# Patient Record
Sex: FEMALE | Race: WHITE | NOT HISPANIC OR LATINO | Employment: FULL TIME | URBAN - METROPOLITAN AREA
[De-identification: names, ages, dates, MRNs, and addresses within clinical notes are randomized per-mention and may not be internally consistent; named-entity substitution may affect disease eponyms.]

---

## 2019-02-18 ENCOUNTER — INITIAL PRENATAL (OUTPATIENT)
Dept: OBGYN CLINIC | Facility: CLINIC | Age: 34
End: 2019-02-18

## 2019-02-18 VITALS
BODY MASS INDEX: 27.28 KG/M2 | RESPIRATION RATE: 16 BRPM | WEIGHT: 180 LBS | SYSTOLIC BLOOD PRESSURE: 124 MMHG | DIASTOLIC BLOOD PRESSURE: 86 MMHG | HEART RATE: 88 BPM | HEIGHT: 68 IN

## 2019-02-18 DIAGNOSIS — Z34.01 ENCOUNTER FOR SUPERVISION OF NORMAL FIRST PREGNANCY IN FIRST TRIMESTER: Primary | ICD-10-CM

## 2019-02-18 DIAGNOSIS — Z3A.09 9 WEEKS GESTATION OF PREGNANCY: ICD-10-CM

## 2019-02-18 PROCEDURE — NOBC: Performed by: OBSTETRICS & GYNECOLOGY

## 2019-02-18 RX ORDER — PRENATAL VIT 91/IRON/FOLIC/DHA 28-975-200
1 COMBINATION PACKAGE (EA) ORAL DAILY
COMMUNITY

## 2019-02-18 NOTE — PROGRESS NOTES
Patient presents for OB intake interview  Accompanied by   Planned pregnancy, FOB involved and supportive     Patient's last menstrual period was 2018 (exact date)  Estimated Date of Delivery: None noted  Prenatal labs ordered including Obstetrical panel with reflex HIV  Pregnancy symptoms - breast tenderness, fatigue, frequent urination, nausea and positive home pregnancy test  Cramping: yes - slight in the first few weeks  Bleeding: no   Tdap - counseled to be given after 27 weeks  Influenza vaccine discussed and information sheet given  Vaccinated: yes  PICA cravings: no  Hx of MRSA: no  Dental visit with last 6 months - no  If no, recommendations discussed  PHQ 9 screening results - 0  Last Pap - unknown  Interview education:  Stan Hernandez Pregnancy Essentials booklet given to patient  Reviewed and explained  Handouts given at today's visit     [facility] phone application guide   [address] support center   CDCs Response to [address] [place] [address] Maternal Fetal Medicine        Sequential screening pamphlet                   Cystic fibrosis information  MyChart activated: no - information sheet given    Interview done by : Etienne Barbour RN       19

## 2019-02-20 LAB
ABO GROUP BLD: (no result)
BASOPHILS # BLD AUTO: 0 X10E3/UL (ref 0–0.2)
BASOPHILS NFR BLD AUTO: 0 %
BLD GP AB SCN SERPL QL: NEGATIVE
EOSINOPHIL # BLD AUTO: 0.1 X10E3/UL (ref 0–0.4)
EOSINOPHIL NFR BLD AUTO: 1 %
ERYTHROCYTE [DISTWIDTH] IN BLOOD BY AUTOMATED COUNT: 13.8 % (ref 12.3–15.4)
HBV SURFACE AG SERPL QL IA: NEGATIVE
HCT VFR BLD AUTO: 41.9 % (ref 34–46.6)
HGB BLD-MCNC: 14 G/DL (ref 11.1–15.9)
HIV 1+2 AB+HIV1 P24 AG SERPL QL IA: NON REACTIVE
IMM GRANULOCYTES # BLD: 0 X10E3/UL (ref 0–0.1)
IMM GRANULOCYTES NFR BLD: 0 %
LYMPHOCYTES # BLD AUTO: 1.6 X10E3/UL (ref 0.7–3.1)
LYMPHOCYTES NFR BLD AUTO: 16 %
MCH RBC QN AUTO: 29.2 PG (ref 26.6–33)
MCHC RBC AUTO-ENTMCNC: 33.4 G/DL (ref 31.5–35.7)
MCV RBC AUTO: 88 FL (ref 79–97)
MONOCYTES # BLD AUTO: 0.4 X10E3/UL (ref 0.1–0.9)
MONOCYTES NFR BLD AUTO: 4 %
NEUTROPHILS # BLD AUTO: 7.8 X10E3/UL (ref 1.4–7)
NEUTROPHILS NFR BLD AUTO: 79 %
PLATELET # BLD AUTO: 309 X10E3/UL (ref 150–379)
RBC # BLD AUTO: 4.79 X10E6/UL (ref 3.77–5.28)
RH BLD: POSITIVE
RPR SER QL: NON REACTIVE
RUBV IGG SERPL IA-ACNC: 5.93 INDEX
WBC # BLD AUTO: 9.9 X10E3/UL (ref 3.4–10.8)

## 2019-02-26 ENCOUNTER — HOSPITAL ENCOUNTER (OUTPATIENT)
Dept: RADIOLOGY | Facility: HOSPITAL | Age: 34
Discharge: HOME | End: 2019-02-26
Payer: COMMERCIAL

## 2019-02-26 DIAGNOSIS — Z34.01 ENCOUNTER FOR SUPERVISION OF NORMAL FIRST PREGNANCY IN FIRST TRIMESTER: ICD-10-CM

## 2019-02-26 DIAGNOSIS — Z3A.09 9 WEEKS GESTATION OF PREGNANCY: ICD-10-CM

## 2019-02-26 PROCEDURE — 76801 OB US < 14 WKS SINGLE FETUS: CPT

## 2019-03-07 ENCOUNTER — ROUTINE PRENATAL (OUTPATIENT)
Dept: OBGYN CLINIC | Facility: CLINIC | Age: 34
End: 2019-03-07
Payer: COMMERCIAL

## 2019-03-07 VITALS — SYSTOLIC BLOOD PRESSURE: 132 MMHG | BODY MASS INDEX: 27.06 KG/M2 | WEIGHT: 178 LBS | DIASTOLIC BLOOD PRESSURE: 80 MMHG

## 2019-03-07 DIAGNOSIS — Z12.4 SCREENING FOR MALIGNANT NEOPLASM OF CERVIX: ICD-10-CM

## 2019-03-07 DIAGNOSIS — Z34.01 ENCOUNTER FOR SUPERVISION OF NORMAL FIRST PREGNANCY IN FIRST TRIMESTER: Primary | ICD-10-CM

## 2019-03-07 DIAGNOSIS — Z11.51 SCREENING FOR HPV (HUMAN PAPILLOMAVIRUS): ICD-10-CM

## 2019-03-07 DIAGNOSIS — Z3A.12 12 WEEKS GESTATION OF PREGNANCY: ICD-10-CM

## 2019-03-07 DIAGNOSIS — Z11.3 SCREENING FOR STD (SEXUALLY TRANSMITTED DISEASE): ICD-10-CM

## 2019-03-07 PROCEDURE — NOBC: Performed by: OBSTETRICS & GYNECOLOGY

## 2019-03-07 PROCEDURE — 76817 TRANSVAGINAL US OBSTETRIC: CPT | Performed by: OBSTETRICS & GYNECOLOGY

## 2019-03-07 NOTE — PROGRESS NOTES
Pt is tired and nauseous, but no other complaints  Pt is due for her pap and GC testing today  Pt had her flu shot

## 2019-03-07 NOTE — PROGRESS NOTES
This is a 36 y o   at 12w0d by 10 wk US/LMP who presents for initial OB visit  No complaints  She denies nausea, vomiting, cramping, vaginal bleeding  This is a planned and desired pregnancy  BP: 132/80  Ultrasound: Adame IUP with cardiac activity  Normal appearing uterus, left ovary  R ovary with small, simple appearing cyst    Placenta appears to be posterior with leading edge just overlying the cervix  Reassess at 20 wk US  Pap/GCC performed  Prenatal labs reviewed and wnl  Practice policies reviewed  Weight gain, diet, exercise recommendations discussed  Genetic screening declined  Level 2 US ordered  S/p flu shot at intake visit  History of anxiety - not on meds for several years  Does state she anticipates significant anxiety at time of delivery - she is afraid of medical procedures, has never stayed overnight in a hospital  Discussed prenatal classes, hospital tour, increased risk of postpartum anxiety  Ultrasound Probe Disinfection    A transvaginal ultrasound was performed     Prior to use, disinfection was performed with High Level Disinfection Process (Trophon)  Probe serial number [id number]: [id number] was used    Sara Shields DO  19  1:34 PM

## 2019-03-12 LAB
C TRACH RRNA CVX QL NAA+PROBE: NEGATIVE
CYTOLOGIST CVX/VAG CYTO: NORMAL
DX ICD CODE: NORMAL
HPV I/H RISK 1 DNA CVX QL PROBE+SIG AMP: NEGATIVE
N GONORRHOEA RRNA CVX QL NAA+PROBE: NEGATIVE
OTHER STN SPEC: NORMAL
PATH REPORT.FINAL DX SPEC: NORMAL
SL AMB NOTE:: NORMAL
SL AMB SPECIMEN ADEQUACY: NORMAL
SL AMB TEST METHODOLOGY: NORMAL

## 2019-04-02 ENCOUNTER — ROUTINE PRENATAL (OUTPATIENT)
Dept: OBGYN CLINIC | Facility: CLINIC | Age: 34
End: 2019-04-02

## 2019-04-02 VITALS — SYSTOLIC BLOOD PRESSURE: 112 MMHG | WEIGHT: 183 LBS | BODY MASS INDEX: 27.83 KG/M2 | DIASTOLIC BLOOD PRESSURE: 64 MMHG

## 2019-04-02 DIAGNOSIS — Z3A.15 15 WEEKS GESTATION OF PREGNANCY: ICD-10-CM

## 2019-04-02 DIAGNOSIS — Z34.82 ENCOUNTER FOR SUPERVISION OF OTHER NORMAL PREGNANCY, SECOND TRIMESTER: Primary | ICD-10-CM

## 2019-04-02 PROCEDURE — PNV: Performed by: OBSTETRICS & GYNECOLOGY

## 2019-04-30 ENCOUNTER — ROUTINE PRENATAL (OUTPATIENT)
Dept: OBGYN CLINIC | Facility: CLINIC | Age: 34
End: 2019-04-30

## 2019-04-30 VITALS — SYSTOLIC BLOOD PRESSURE: 130 MMHG | DIASTOLIC BLOOD PRESSURE: 82 MMHG | BODY MASS INDEX: 27.83 KG/M2 | WEIGHT: 183 LBS

## 2019-04-30 DIAGNOSIS — Z34.82 ENCOUNTER FOR SUPERVISION OF OTHER NORMAL PREGNANCY, SECOND TRIMESTER: Primary | ICD-10-CM

## 2019-04-30 DIAGNOSIS — Z3A.19 19 WEEKS GESTATION OF PREGNANCY: ICD-10-CM

## 2019-04-30 DIAGNOSIS — R12 HEART BURN: ICD-10-CM

## 2019-04-30 PROCEDURE — PNV: Performed by: OBSTETRICS & GYNECOLOGY

## 2019-04-30 RX ORDER — CALCIUM CARBONATE 200(500)MG
1 TABLET,CHEWABLE ORAL DAILY
COMMUNITY

## 2019-04-30 RX ORDER — FAMOTIDINE 20 MG/1
20 TABLET, FILM COATED ORAL 2 TIMES DAILY
COMMUNITY
End: 2019-07-15

## 2019-04-30 RX ORDER — OMEPRAZOLE 20 MG/1
20 CAPSULE, DELAYED RELEASE ORAL DAILY
Qty: 90 CAPSULE | Refills: 3 | Status: SHIPPED | OUTPATIENT
Start: 2019-04-30 | End: 2019-05-16 | Stop reason: ALTCHOICE

## 2019-05-15 PROBLEM — Z3A.19 19 WEEKS GESTATION OF PREGNANCY: Status: RESOLVED | Noted: 2019-03-07 | Resolved: 2019-05-15

## 2019-05-16 ENCOUNTER — ROUTINE PRENATAL (OUTPATIENT)
Dept: PERINATAL CARE | Facility: CLINIC | Age: 34
End: 2019-05-16
Payer: COMMERCIAL

## 2019-05-16 VITALS
HEIGHT: 69 IN | HEART RATE: 84 BPM | SYSTOLIC BLOOD PRESSURE: 128 MMHG | WEIGHT: 186.2 LBS | DIASTOLIC BLOOD PRESSURE: 81 MMHG | BODY MASS INDEX: 27.58 KG/M2

## 2019-05-16 DIAGNOSIS — Z36.3 ENCOUNTER FOR ANTENATAL SCREENING FOR MALFORMATION: ICD-10-CM

## 2019-05-16 DIAGNOSIS — Z3A.22 22 WEEKS GESTATION OF PREGNANCY: ICD-10-CM

## 2019-05-16 DIAGNOSIS — Z36.86 ENCOUNTER FOR ANTENATAL SCREENING FOR CERVICAL LENGTH: Primary | ICD-10-CM

## 2019-05-16 DIAGNOSIS — O44.00 PLACENTA PREVIA ANTEPARTUM: ICD-10-CM

## 2019-05-16 PROCEDURE — 76805 OB US >/= 14 WKS SNGL FETUS: CPT | Performed by: OBSTETRICS & GYNECOLOGY

## 2019-05-16 PROCEDURE — 99242 OFF/OP CONSLTJ NEW/EST SF 20: CPT | Performed by: OBSTETRICS & GYNECOLOGY

## 2019-05-16 PROCEDURE — 76817 TRANSVAGINAL US OBSTETRIC: CPT | Performed by: OBSTETRICS & GYNECOLOGY

## 2019-05-16 RX ORDER — FLUTICASONE PROPIONATE 50 MCG
1 SPRAY, SUSPENSION (ML) NASAL DAILY
COMMUNITY
End: 2019-07-15

## 2019-05-28 ENCOUNTER — ROUTINE PRENATAL (OUTPATIENT)
Dept: OBGYN CLINIC | Facility: CLINIC | Age: 34
End: 2019-05-28

## 2019-05-28 VITALS — DIASTOLIC BLOOD PRESSURE: 74 MMHG | WEIGHT: 187 LBS | SYSTOLIC BLOOD PRESSURE: 122 MMHG | BODY MASS INDEX: 27.62 KG/M2

## 2019-05-28 DIAGNOSIS — Z34.82 ENCOUNTER FOR SUPERVISION OF OTHER NORMAL PREGNANCY IN SECOND TRIMESTER: Primary | ICD-10-CM

## 2019-05-28 DIAGNOSIS — Z34.82 ENCOUNTER FOR SUPERVISION OF OTHER NORMAL PREGNANCY, SECOND TRIMESTER: ICD-10-CM

## 2019-05-28 DIAGNOSIS — Z3A.23 23 WEEKS GESTATION OF PREGNANCY: ICD-10-CM

## 2019-05-28 PROCEDURE — PNV: Performed by: OBSTETRICS & GYNECOLOGY

## 2019-06-13 LAB
BASOPHILS # BLD AUTO: 0 X10E3/UL (ref 0–0.2)
BASOPHILS NFR BLD AUTO: 0 %
EOSINOPHIL # BLD AUTO: 0.1 X10E3/UL (ref 0–0.4)
EOSINOPHIL NFR BLD AUTO: 1 %
ERYTHROCYTE [DISTWIDTH] IN BLOOD BY AUTOMATED COUNT: 13.9 % (ref 12.3–15.4)
GLUCOSE 1H P 50 G GLC PO SERPL-MCNC: 114 MG/DL (ref 65–139)
HCT VFR BLD AUTO: 30.2 % (ref 34–46.6)
HGB BLD-MCNC: 10 G/DL (ref 11.1–15.9)
IMM GRANULOCYTES # BLD: 0.1 X10E3/UL (ref 0–0.1)
IMM GRANULOCYTES NFR BLD: 1 %
LYMPHOCYTES # BLD AUTO: 1.4 X10E3/UL (ref 0.7–3.1)
LYMPHOCYTES NFR BLD AUTO: 14 %
MCH RBC QN AUTO: 29.9 PG (ref 26.6–33)
MCHC RBC AUTO-ENTMCNC: 33.1 G/DL (ref 31.5–35.7)
MCV RBC AUTO: 90 FL (ref 79–97)
MONOCYTES # BLD AUTO: 0.6 X10E3/UL (ref 0.1–0.9)
MONOCYTES NFR BLD AUTO: 6 %
NEUTROPHILS # BLD AUTO: 7.9 X10E3/UL (ref 1.4–7)
NEUTROPHILS NFR BLD AUTO: 78 %
PLATELET # BLD AUTO: 238 X10E3/UL (ref 150–450)
RBC # BLD AUTO: 3.35 X10E6/UL (ref 3.77–5.28)
RPR SER QL: NON REACTIVE
WBC # BLD AUTO: 10.1 X10E3/UL (ref 3.4–10.8)

## 2019-06-28 ENCOUNTER — ROUTINE PRENATAL (OUTPATIENT)
Dept: OBGYN CLINIC | Facility: CLINIC | Age: 34
End: 2019-06-28
Payer: COMMERCIAL

## 2019-06-28 VITALS — WEIGHT: 194 LBS | SYSTOLIC BLOOD PRESSURE: 112 MMHG | DIASTOLIC BLOOD PRESSURE: 64 MMHG | BODY MASS INDEX: 28.65 KG/M2

## 2019-06-28 DIAGNOSIS — Z3A.28 28 WEEKS GESTATION OF PREGNANCY: ICD-10-CM

## 2019-06-28 DIAGNOSIS — Z23 NEED FOR TDAP VACCINATION: ICD-10-CM

## 2019-06-28 DIAGNOSIS — R12 HEART BURN: ICD-10-CM

## 2019-06-28 DIAGNOSIS — Z34.83 ENCOUNTER FOR SUPERVISION OF OTHER NORMAL PREGNANCY, THIRD TRIMESTER: Primary | ICD-10-CM

## 2019-06-28 PROCEDURE — 90715 TDAP VACCINE 7 YRS/> IM: CPT | Performed by: OBSTETRICS & GYNECOLOGY

## 2019-06-28 PROCEDURE — 90471 IMMUNIZATION ADMIN: CPT | Performed by: OBSTETRICS & GYNECOLOGY

## 2019-06-28 PROCEDURE — PNV: Performed by: OBSTETRICS & GYNECOLOGY

## 2019-06-28 RX ORDER — OMEPRAZOLE 10 MG/1
20 CAPSULE, DELAYED RELEASE ORAL DAILY
Qty: 60 CAPSULE | Refills: 0 | Status: SHIPPED | OUTPATIENT
Start: 2019-06-28 | End: 2019-07-29

## 2019-07-11 ENCOUNTER — ULTRASOUND (OUTPATIENT)
Dept: PERINATAL CARE | Facility: CLINIC | Age: 34
End: 2019-07-11
Payer: COMMERCIAL

## 2019-07-11 VITALS
BODY MASS INDEX: 28.35 KG/M2 | DIASTOLIC BLOOD PRESSURE: 95 MMHG | HEIGHT: 70 IN | WEIGHT: 198 LBS | SYSTOLIC BLOOD PRESSURE: 134 MMHG | HEART RATE: 98 BPM

## 2019-07-11 DIAGNOSIS — Z34.83 ENCOUNTER FOR SUPERVISION OF OTHER NORMAL PREGNANCY, THIRD TRIMESTER: ICD-10-CM

## 2019-07-11 DIAGNOSIS — O44.00 PLACENTA PREVIA ANTEPARTUM: Primary | ICD-10-CM

## 2019-07-11 DIAGNOSIS — Z3A.30 30 WEEKS GESTATION OF PREGNANCY: ICD-10-CM

## 2019-07-11 PROCEDURE — 76817 TRANSVAGINAL US OBSTETRIC: CPT | Performed by: OBSTETRICS & GYNECOLOGY

## 2019-07-11 PROCEDURE — 76816 OB US FOLLOW-UP PER FETUS: CPT | Performed by: OBSTETRICS & GYNECOLOGY

## 2019-07-11 RX ORDER — FERROUS SULFATE 325(65) MG
325 TABLET ORAL
COMMUNITY
End: 2019-09-19 | Stop reason: ALTCHOICE

## 2019-07-11 NOTE — PROGRESS NOTES
The patient was seen today for an ultrasound  Please see ultrasound report (located under Ob Procedures) for additional details  Thank you very much for allowing us to participate in the care of this very nice patient  Should you have any questions, please do not hesitate to contact me  Oscar Bains MD [phone]  Attending Physician, [place]

## 2019-07-15 ENCOUNTER — ROUTINE PRENATAL (OUTPATIENT)
Dept: OBGYN CLINIC | Facility: CLINIC | Age: 34
End: 2019-07-15

## 2019-07-15 VITALS — SYSTOLIC BLOOD PRESSURE: 128 MMHG | BODY MASS INDEX: 28.27 KG/M2 | WEIGHT: 197 LBS | DIASTOLIC BLOOD PRESSURE: 84 MMHG

## 2019-07-15 DIAGNOSIS — Z3A.30 30 WEEKS GESTATION OF PREGNANCY: ICD-10-CM

## 2019-07-15 DIAGNOSIS — Z34.03 ENCOUNTER FOR SUPERVISION OF NORMAL FIRST PREGNANCY IN THIRD TRIMESTER: Primary | ICD-10-CM

## 2019-07-15 PROCEDURE — PNV: Performed by: OBSTETRICS & GYNECOLOGY

## 2019-07-15 NOTE — PROGRESS NOTES
36 y o    female at 30w3d EGA for PNV  BP : 128/84  TWlbs  Fibroid on US - 2cm  Not likely to affect pregnancy, may resolve post partum  No change since scan at 13weeks  Discussed epidural for pain management, risks/benefits  Patient has a lot of anxiety regarding epidural   She plans on doing a birthing class  Patient plans on breast-feeding

## 2019-07-29 ENCOUNTER — TELEPHONE (OUTPATIENT)
Dept: OBGYN CLINIC | Facility: CLINIC | Age: 34
End: 2019-07-29

## 2019-07-29 DIAGNOSIS — R12 HEART BURN: ICD-10-CM

## 2019-07-29 RX ORDER — OMEPRAZOLE 10 MG/1
20 CAPSULE, DELAYED RELEASE ORAL DAILY
Qty: 60 CAPSULE | Refills: 0 | Status: SHIPPED | OUTPATIENT
Start: 2019-07-29 | End: 2019-08-29 | Stop reason: SDUPTHER

## 2019-08-01 ENCOUNTER — ROUTINE PRENATAL (OUTPATIENT)
Dept: OBGYN CLINIC | Facility: CLINIC | Age: 34
End: 2019-08-01

## 2019-08-01 VITALS — SYSTOLIC BLOOD PRESSURE: 124 MMHG | WEIGHT: 200 LBS | BODY MASS INDEX: 28.7 KG/M2 | DIASTOLIC BLOOD PRESSURE: 70 MMHG

## 2019-08-01 DIAGNOSIS — Z34.03 ENCOUNTER FOR SUPERVISION OF NORMAL FIRST PREGNANCY IN THIRD TRIMESTER: Primary | ICD-10-CM

## 2019-08-01 DIAGNOSIS — Z3A.33 33 WEEKS GESTATION OF PREGNANCY: ICD-10-CM

## 2019-08-01 DIAGNOSIS — Z34.83 ENCOUNTER FOR SUPERVISION OF OTHER NORMAL PREGNANCY, THIRD TRIMESTER: ICD-10-CM

## 2019-08-01 PROCEDURE — PNV: Performed by: OBSTETRICS & GYNECOLOGY

## 2019-08-01 NOTE — PROGRESS NOTES
C/o cramping on right side   C/o lower pelvic pain on left side when going from sitting to standing position   Perineal massage info due   Urine dip neg/neg

## 2019-08-01 NOTE — PROGRESS NOTES
This is a 36 y o   at 33w0d who presents for return OB visit  No complaints  Denies contractions, leakage, bleeding   Endorses fetal movement   BP: 124/70 TWlb  Discussed normal discomforts of pregnancy  Perineal massage info given  F/up 2 wks

## 2019-08-14 ENCOUNTER — ROUTINE PRENATAL (OUTPATIENT)
Dept: OBGYN CLINIC | Facility: CLINIC | Age: 34
End: 2019-08-14

## 2019-08-14 VITALS — SYSTOLIC BLOOD PRESSURE: 130 MMHG | DIASTOLIC BLOOD PRESSURE: 84 MMHG | BODY MASS INDEX: 29.21 KG/M2 | WEIGHT: 203.6 LBS

## 2019-08-14 DIAGNOSIS — O16.3 ELEVATED BLOOD PRESSURE AFFECTING PREGNANCY IN THIRD TRIMESTER, ANTEPARTUM: Primary | ICD-10-CM

## 2019-08-14 DIAGNOSIS — Z34.03 ENCOUNTER FOR SUPERVISION OF NORMAL FIRST PREGNANCY IN THIRD TRIMESTER: ICD-10-CM

## 2019-08-14 DIAGNOSIS — Z34.83 ENCOUNTER FOR SUPERVISION OF OTHER NORMAL PREGNANCY, THIRD TRIMESTER: ICD-10-CM

## 2019-08-14 DIAGNOSIS — Z3A.34 34 WEEKS GESTATION OF PREGNANCY: ICD-10-CM

## 2019-08-14 PROCEDURE — PNV: Performed by: OBSTETRICS & GYNECOLOGY

## 2019-08-14 NOTE — PROGRESS NOTES
This is a 36 y o   at 34w6d who presents for return OB visit  Feeling tingling in her hands that wakes her up in the early morning - likely carpal tunnel, discussed rigid wrist braces  Denies contractions, leakage, bleeding  Endorses fetal movement   BP: 144/90, repeat 130/84 TW lb  Elevated BP: Pt reports having elevated BP at other doc's offices prior to pregnancy but no formal dx of HTN, never been treated  Asymptomatic  Pre-E labs and P:C ordered  Discussed preeclampsia precautions  F/up 1 wk for BP check, will need NST if GHTN is diagnosed

## 2019-08-15 LAB
ALBUMIN SERPL-MCNC: 3.7 G/DL (ref 3.5–5.5)
ALBUMIN/GLOB SERPL: 1.5 {RATIO} (ref 1.2–2.2)
ALP SERPL-CCNC: 63 IU/L (ref 39–117)
ALT SERPL-CCNC: 12 IU/L (ref 0–32)
AST SERPL-CCNC: 15 IU/L (ref 0–40)
BILIRUB SERPL-MCNC: <0.2 MG/DL (ref 0–1.2)
BUN SERPL-MCNC: 8 MG/DL (ref 6–20)
BUN/CREAT SERPL: 14 (ref 9–23)
CALCIUM SERPL-MCNC: 9.8 MG/DL (ref 8.7–10.2)
CHLORIDE SERPL-SCNC: 103 MMOL/L (ref 96–106)
CO2 SERPL-SCNC: 21 MMOL/L (ref 20–29)
CREAT SERPL-MCNC: 0.58 MG/DL (ref 0.57–1)
CREAT UR-MCNC: 46.1 MG/DL
ERYTHROCYTE [DISTWIDTH] IN BLOOD BY AUTOMATED COUNT: 14.1 % (ref 12.3–15.4)
GLOBULIN SER-MCNC: 2.5 G/DL (ref 1.5–4.5)
GLUCOSE SERPL-MCNC: 80 MG/DL (ref 65–99)
HCT VFR BLD AUTO: 32 % (ref 34–46.6)
HGB BLD-MCNC: 11.2 G/DL (ref 11.1–15.9)
MCH RBC QN AUTO: 30.4 PG (ref 26.6–33)
MCHC RBC AUTO-ENTMCNC: 35 G/DL (ref 31.5–35.7)
MCV RBC AUTO: 87 FL (ref 79–97)
PLATELET # BLD AUTO: 201 X10E3/UL (ref 150–450)
POTASSIUM SERPL-SCNC: 3.8 MMOL/L (ref 3.5–5.2)
PROT SERPL-MCNC: 6.2 G/DL (ref 6–8.5)
PROT UR-MCNC: 10.9 MG/DL
PROT/CREAT UR: 236 MG/G CREAT (ref 0–200)
RBC # BLD AUTO: 3.68 X10E6/UL (ref 3.77–5.28)
SL AMB EGFR AFRICAN AMERICAN: 140 ML/MIN/1.73
SL AMB EGFR NON AFRICAN AMERICAN: 121 ML/MIN/1.73
SODIUM SERPL-SCNC: 138 MMOL/L (ref 134–144)
WBC # BLD AUTO: 11 X10E3/UL (ref 3.4–10.8)

## 2019-08-22 ENCOUNTER — ROUTINE PRENATAL (OUTPATIENT)
Dept: OBGYN CLINIC | Facility: CLINIC | Age: 34
End: 2019-08-22

## 2019-08-22 VITALS — BODY MASS INDEX: 29.27 KG/M2 | DIASTOLIC BLOOD PRESSURE: 82 MMHG | WEIGHT: 204 LBS | SYSTOLIC BLOOD PRESSURE: 122 MMHG

## 2019-08-22 DIAGNOSIS — Z34.03 ENCOUNTER FOR SUPERVISION OF NORMAL FIRST PREGNANCY IN THIRD TRIMESTER: Primary | ICD-10-CM

## 2019-08-22 DIAGNOSIS — Z3A.36 36 WEEKS GESTATION OF PREGNANCY: ICD-10-CM

## 2019-08-22 LAB — EXTERNAL GROUP B STREP ANTIGEN: NEGATIVE

## 2019-08-22 PROCEDURE — PNV: Performed by: OBSTETRICS & GYNECOLOGY

## 2019-08-22 NOTE — PROGRESS NOTES
Patient is a 80-year-old  at 36 0 weeks gestation here for routine prenatal visit  BP: 122/82  TWG 29lb    Patient doing well today  No complaints  Denies contractions, vaginal bleeding, and LOF  Reports good daily FM  Prior visit with elevated /90  PreE labs all WNL  P/C ratio 0 23  Normotensive BP since  Labor precautions discussed  GBS collected  Follow up result  Patient to RTO in 1 week

## 2019-08-24 LAB — GP B STREP DNA SPEC QL NAA+PROBE: NEGATIVE

## 2019-08-29 ENCOUNTER — ROUTINE PRENATAL (OUTPATIENT)
Dept: OBGYN CLINIC | Facility: CLINIC | Age: 34
End: 2019-08-29

## 2019-08-29 VITALS — DIASTOLIC BLOOD PRESSURE: 84 MMHG | BODY MASS INDEX: 29.53 KG/M2 | WEIGHT: 205.8 LBS | SYSTOLIC BLOOD PRESSURE: 132 MMHG

## 2019-08-29 DIAGNOSIS — Z34.03 ENCOUNTER FOR SUPERVISION OF NORMAL FIRST PREGNANCY IN THIRD TRIMESTER: Primary | ICD-10-CM

## 2019-08-29 DIAGNOSIS — R12 HEART BURN: ICD-10-CM

## 2019-08-29 DIAGNOSIS — Z3A.37 37 WEEKS GESTATION OF PREGNANCY: ICD-10-CM

## 2019-08-29 PROCEDURE — PNV: Performed by: OBSTETRICS & GYNECOLOGY

## 2019-08-29 RX ORDER — OMEPRAZOLE 10 MG/1
20 CAPSULE, DELAYED RELEASE ORAL DAILY
Qty: 60 CAPSULE | Refills: 1 | Status: SHIPPED | OUTPATIENT
Start: 2019-08-29 | End: 2019-09-19 | Stop reason: ALTCHOICE

## 2019-08-29 NOTE — PROGRESS NOTES
Patient is a 31yo  at 37 0 wks EGA here for routine prenatal visit  BP: 132/84  TWG 30lb    Patient doing well today  Has no complaints  Denies contractions, vaginal bleeding, pressure, and LOF  Reports good FM  Prilosec reordered  Controlled heart burn  GBS negative  Labor precautions reviewed  Kick counts reviewed  Pt to RTO in 1 week

## 2019-09-05 ENCOUNTER — ROUTINE PRENATAL (OUTPATIENT)
Dept: OBGYN CLINIC | Facility: CLINIC | Age: 34
End: 2019-09-05

## 2019-09-05 VITALS — DIASTOLIC BLOOD PRESSURE: 82 MMHG | SYSTOLIC BLOOD PRESSURE: 130 MMHG | BODY MASS INDEX: 29.7 KG/M2 | WEIGHT: 207 LBS

## 2019-09-05 DIAGNOSIS — Z3A.38 38 WEEKS GESTATION OF PREGNANCY: ICD-10-CM

## 2019-09-05 DIAGNOSIS — Z34.03 ENCOUNTER FOR SUPERVISION OF NORMAL FIRST PREGNANCY IN THIRD TRIMESTER: Primary | ICD-10-CM

## 2019-09-05 PROCEDURE — PNV: Performed by: OBSTETRICS & GYNECOLOGY

## 2019-09-06 NOTE — PROGRESS NOTES
36 y o    female at 45 wga EGA for PNV  BP : 130/83  TW  Feeling well    No complaints  Reviewed labor precautions and FKCs  SVE 1-50- -2  Follow up in 1 week

## 2019-09-12 ENCOUNTER — ANESTHESIA (INPATIENT)
Dept: ANESTHESIOLOGY | Facility: HOSPITAL | Age: 34
End: 2019-09-12
Payer: COMMERCIAL

## 2019-09-12 ENCOUNTER — ROUTINE PRENATAL (OUTPATIENT)
Dept: OBGYN CLINIC | Facility: CLINIC | Age: 34
End: 2019-09-12

## 2019-09-12 ENCOUNTER — HOSPITAL ENCOUNTER (INPATIENT)
Facility: HOSPITAL | Age: 34
LOS: 3 days | Discharge: HOME | End: 2019-09-15
Attending: OBSTETRICS & GYNECOLOGY | Admitting: OBSTETRICS & GYNECOLOGY
Payer: COMMERCIAL

## 2019-09-12 ENCOUNTER — ANESTHESIA EVENT (INPATIENT)
Dept: ANESTHESIOLOGY | Facility: HOSPITAL | Age: 34
End: 2019-09-12
Payer: COMMERCIAL

## 2019-09-12 VITALS — WEIGHT: 210.2 LBS | SYSTOLIC BLOOD PRESSURE: 144 MMHG | DIASTOLIC BLOOD PRESSURE: 92 MMHG | BODY MASS INDEX: 30.16 KG/M2

## 2019-09-12 DIAGNOSIS — Z3A.39 39 WEEKS GESTATION OF PREGNANCY: ICD-10-CM

## 2019-09-12 DIAGNOSIS — Z3A.39 39 WEEKS GESTATION OF PREGNANCY: Primary | ICD-10-CM

## 2019-09-12 DIAGNOSIS — O13.3 GESTATIONAL HYPERTENSION, THIRD TRIMESTER: Primary | ICD-10-CM

## 2019-09-12 LAB
ABO GROUP BLD: NORMAL
ALBUMIN SERPL BCP-MCNC: 2.8 G/DL (ref 3.5–5)
ALP SERPL-CCNC: 86 U/L (ref 46–116)
ALT SERPL W P-5'-P-CCNC: 16 U/L (ref 12–78)
ANION GAP SERPL CALCULATED.3IONS-SCNC: 8 MMOL/L (ref 4–13)
AST SERPL W P-5'-P-CCNC: 13 U/L (ref 5–45)
BACTERIA UR QL AUTO: ABNORMAL /HPF
BASOPHILS # BLD AUTO: 0.03 THOUSANDS/ΜL (ref 0–0.1)
BASOPHILS NFR BLD AUTO: 0 % (ref 0–1)
BILIRUB SERPL-MCNC: 0.17 MG/DL (ref 0.2–1)
BILIRUB UR QL STRIP: NEGATIVE
BLD GP AB SCN SERPL QL: NEGATIVE
BUN SERPL-MCNC: 7 MG/DL (ref 5–25)
CALCIUM SERPL-MCNC: 9.4 MG/DL (ref 8.3–10.1)
CHLORIDE SERPL-SCNC: 108 MMOL/L (ref 100–108)
CLARITY UR: ABNORMAL
CO2 SERPL-SCNC: 22 MMOL/L (ref 21–32)
COLOR UR: YELLOW
CREAT SERPL-MCNC: 0.53 MG/DL (ref 0.6–1.3)
CREAT UR-MCNC: 68 MG/DL
EOSINOPHIL # BLD AUTO: 0.05 THOUSAND/ΜL (ref 0–0.61)
EOSINOPHIL NFR BLD AUTO: 1 % (ref 0–6)
ERYTHROCYTE [DISTWIDTH] IN BLOOD BY AUTOMATED COUNT: 14.2 % (ref 11.6–15.1)
GFR SERPL CREATININE-BSD FRML MDRD: 125 ML/MIN/1.73SQ M
GLUCOSE SERPL-MCNC: 82 MG/DL (ref 65–140)
GLUCOSE UR STRIP-MCNC: NEGATIVE MG/DL
HCT VFR BLD AUTO: 35.6 % (ref 34.8–46.1)
HGB BLD-MCNC: 11.4 G/DL (ref 11.5–15.4)
HGB UR QL STRIP.AUTO: ABNORMAL
IMM GRANULOCYTES # BLD AUTO: 0.07 THOUSAND/UL (ref 0–0.2)
IMM GRANULOCYTES NFR BLD AUTO: 1 % (ref 0–2)
KETONES UR STRIP-MCNC: NEGATIVE MG/DL
LEUKOCYTE ESTERASE UR QL STRIP: ABNORMAL
LYMPHOCYTES # BLD AUTO: 1.27 THOUSANDS/ΜL (ref 0.6–4.47)
LYMPHOCYTES NFR BLD AUTO: 13 % (ref 14–44)
MCH RBC QN AUTO: 28.9 PG (ref 26.8–34.3)
MCHC RBC AUTO-ENTMCNC: 32 G/DL (ref 31.4–37.4)
MCV RBC AUTO: 90 FL (ref 82–98)
MONOCYTES # BLD AUTO: 0.56 THOUSAND/ΜL (ref 0.17–1.22)
MONOCYTES NFR BLD AUTO: 6 % (ref 4–12)
NEUTROPHILS # BLD AUTO: 7.6 THOUSANDS/ΜL (ref 1.85–7.62)
NEUTS SEG NFR BLD AUTO: 79 % (ref 43–75)
NITRITE UR QL STRIP: NEGATIVE
NON-SQ EPI CELLS URNS QL MICRO: ABNORMAL /HPF
NRBC BLD AUTO-RTO: 0 /100 WBCS
PH UR STRIP.AUTO: 7.5 [PH]
PLATELET # BLD AUTO: 186 THOUSANDS/UL (ref 149–390)
PMV BLD AUTO: 11.4 FL (ref 8.9–12.7)
POTASSIUM SERPL-SCNC: 3.6 MMOL/L (ref 3.5–5.3)
PROT SERPL-MCNC: 7.1 G/DL (ref 6.4–8.2)
PROT UR STRIP-MCNC: NEGATIVE MG/DL
PROT UR-MCNC: 22 MG/DL
PROT/CREAT UR: 0.32 MG/G{CREAT} (ref 0–0.1)
RBC # BLD AUTO: 3.94 MILLION/UL (ref 3.81–5.12)
RBC #/AREA URNS AUTO: ABNORMAL /HPF
RH BLD: POSITIVE
RPR SER QL: NORMAL
SODIUM SERPL-SCNC: 138 MMOL/L (ref 136–145)
SP GR UR STRIP.AUTO: 1.01 (ref 1–1.03)
SPECIMEN EXPIRATION DATE: NORMAL
UROBILINOGEN UR QL STRIP.AUTO: 0.2 E.U./DL
WBC # BLD AUTO: 9.58 THOUSAND/UL (ref 4.31–10.16)
WBC #/AREA URNS AUTO: ABNORMAL /HPF

## 2019-09-12 PROCEDURE — 86850 RBC ANTIBODY SCREEN: CPT | Performed by: OBSTETRICS & GYNECOLOGY

## 2019-09-12 PROCEDURE — 81001 URINALYSIS AUTO W/SCOPE: CPT | Performed by: OBSTETRICS & GYNECOLOGY

## 2019-09-12 PROCEDURE — 84156 ASSAY OF PROTEIN URINE: CPT | Performed by: OBSTETRICS & GYNECOLOGY

## 2019-09-12 PROCEDURE — 10H07YZ INSERTION OF OTHER DEVICE INTO PRODUCTS OF CONCEPTION, VIA NATURAL OR ARTIFICIAL OPENING: ICD-10-PCS | Performed by: OBSTETRICS & GYNECOLOGY

## 2019-09-12 PROCEDURE — 86901 BLOOD TYPING SEROLOGIC RH(D): CPT | Performed by: OBSTETRICS & GYNECOLOGY

## 2019-09-12 PROCEDURE — 86592 SYPHILIS TEST NON-TREP QUAL: CPT | Performed by: OBSTETRICS & GYNECOLOGY

## 2019-09-12 PROCEDURE — 3E033VJ INTRODUCTION OF OTHER HORMONE INTO PERIPHERAL VEIN, PERCUTANEOUS APPROACH: ICD-10-PCS | Performed by: OBSTETRICS & GYNECOLOGY

## 2019-09-12 PROCEDURE — PNV: Performed by: OBSTETRICS & GYNECOLOGY

## 2019-09-12 PROCEDURE — 4A1HXCZ MONITORING OF PRODUCTS OF CONCEPTION, CARDIAC RATE, EXTERNAL APPROACH: ICD-10-PCS | Performed by: OBSTETRICS & GYNECOLOGY

## 2019-09-12 PROCEDURE — NC001 PR NO CHARGE: Performed by: OBSTETRICS & GYNECOLOGY

## 2019-09-12 PROCEDURE — 82570 ASSAY OF URINE CREATININE: CPT | Performed by: OBSTETRICS & GYNECOLOGY

## 2019-09-12 PROCEDURE — 86900 BLOOD TYPING SEROLOGIC ABO: CPT | Performed by: OBSTETRICS & GYNECOLOGY

## 2019-09-12 PROCEDURE — 80053 COMPREHEN METABOLIC PANEL: CPT | Performed by: OBSTETRICS & GYNECOLOGY

## 2019-09-12 PROCEDURE — 85025 COMPLETE CBC W/AUTO DIFF WBC: CPT | Performed by: OBSTETRICS & GYNECOLOGY

## 2019-09-12 RX ORDER — ROPIVACAINE HYDROCHLORIDE 2 MG/ML
INJECTION, SOLUTION EPIDURAL; INFILTRATION; PERINEURAL
Status: COMPLETED | OUTPATIENT
Start: 2019-09-12 | End: 2019-09-12

## 2019-09-12 RX ORDER — OXYTOCIN/RINGER'S LACTATE 30/500 ML
1-30 PLASTIC BAG, INJECTION (ML) INTRAVENOUS
Status: DISCONTINUED | OUTPATIENT
Start: 2019-09-12 | End: 2019-09-13

## 2019-09-12 RX ORDER — SODIUM CHLORIDE, SODIUM LACTATE, POTASSIUM CHLORIDE, CALCIUM CHLORIDE 600; 310; 30; 20 MG/100ML; MG/100ML; MG/100ML; MG/100ML
125 INJECTION, SOLUTION INTRAVENOUS CONTINUOUS
Status: DISCONTINUED | OUTPATIENT
Start: 2019-09-12 | End: 2019-09-13

## 2019-09-12 RX ORDER — CALCIUM CARBONATE 200(500)MG
500 TABLET,CHEWABLE ORAL 3 TIMES DAILY PRN
Status: DISCONTINUED | OUTPATIENT
Start: 2019-09-12 | End: 2019-09-13

## 2019-09-12 RX ORDER — OXYTOCIN/RINGER'S LACTATE 30/500 ML
2 PLASTIC BAG, INJECTION (ML) INTRAVENOUS
Status: DISCONTINUED | OUTPATIENT
Start: 2019-09-12 | End: 2019-09-12

## 2019-09-12 RX ORDER — LIDOCAINE HYDROCHLORIDE AND EPINEPHRINE 15; 5 MG/ML; UG/ML
INJECTION, SOLUTION EPIDURAL
Status: COMPLETED | OUTPATIENT
Start: 2019-09-12 | End: 2019-09-12

## 2019-09-12 RX ORDER — ONDANSETRON 2 MG/ML
4 INJECTION INTRAMUSCULAR; INTRAVENOUS EVERY 4 HOURS PRN
Status: DISCONTINUED | OUTPATIENT
Start: 2019-09-12 | End: 2019-09-13

## 2019-09-12 RX ORDER — LIDOCAINE HYDROCHLORIDE 10 MG/ML
INJECTION, SOLUTION INFILTRATION; PERINEURAL
Status: COMPLETED | OUTPATIENT
Start: 2019-09-12 | End: 2019-09-12

## 2019-09-12 RX ORDER — CALCIUM CARBONATE 200(500)MG
500 TABLET,CHEWABLE ORAL DAILY PRN
Status: DISCONTINUED | OUTPATIENT
Start: 2019-09-12 | End: 2019-09-12 | Stop reason: SDUPTHER

## 2019-09-12 RX ADMIN — ONDANSETRON 4 MG: 2 INJECTION INTRAMUSCULAR; INTRAVENOUS at 23:46

## 2019-09-12 RX ADMIN — LIDOCAINE HYDROCHLORIDE AND EPINEPHRINE 5 ML: 15; 5 INJECTION, SOLUTION EPIDURAL at 20:55

## 2019-09-12 RX ADMIN — Medication 2 MILLI-UNITS/MIN: at 13:56

## 2019-09-12 RX ADMIN — CALCIUM CARBONATE (ANTACID) CHEW TAB 500 MG 500 MG: 500 CHEW TAB at 23:41

## 2019-09-12 RX ADMIN — SODIUM CHLORIDE, SODIUM LACTATE, POTASSIUM CHLORIDE, AND CALCIUM CHLORIDE 125 ML/HR: .6; .31; .03; .02 INJECTION, SOLUTION INTRAVENOUS at 23:46

## 2019-09-12 RX ADMIN — SODIUM CHLORIDE, SODIUM LACTATE, POTASSIUM CHLORIDE, AND CALCIUM CHLORIDE 999 ML/HR: .6; .31; .03; .02 INJECTION, SOLUTION INTRAVENOUS at 20:44

## 2019-09-12 RX ADMIN — ROPIVACAINE HYDROCHLORIDE 7 ML: 2 INJECTION, SOLUTION EPIDURAL; INFILTRATION at 20:55

## 2019-09-12 RX ADMIN — ROPIVACAINE HYDROCHLORIDE: 2 INJECTION, SOLUTION EPIDURAL; INFILTRATION at 20:58

## 2019-09-12 RX ADMIN — LIDOCAINE HYDROCHLORIDE 2 ML: 10 INJECTION, SOLUTION INFILTRATION; PERINEURAL at 20:55

## 2019-09-12 RX ADMIN — CALCIUM CARBONATE (ANTACID) CHEW TAB 500 MG 500 MG: 500 CHEW TAB at 22:14

## 2019-09-12 RX ADMIN — SODIUM CHLORIDE, SODIUM LACTATE, POTASSIUM CHLORIDE, AND CALCIUM CHLORIDE 125 ML/HR: .6; .31; .03; .02 INJECTION, SOLUTION INTRAVENOUS at 13:55

## 2019-09-12 NOTE — OB LABOR/OXYTOCIN SAFETY PROGRESS
Oxytocin Safety Progress Check Note - Kenzie Bailey 36 y o  female MRN: [id number]    Unit/Bed#: -01 Encounter: [id number]    Dose (tootie-units/min) Oxytocin: 6 tootie-units/min  Contraction Frequency (minutes): 2-3  Contraction Quality: Mild  Tachysystole: No   Dilation: 5        Effacement (%): 50  Station: -1  Baseline Rate: 125 bpm  Fetal Heart Rate: 143 BPM  FHR Category: Category I  Oxytocin Safety Progress Check: Safety check completed          Notes/comments:    Contractions are getting stronger  Santiago balloon out  AROM - clear  Continue pitocin titration    Christos Pérez MD 9/12/2019 3:59 PM

## 2019-09-12 NOTE — H&P
H&P Exam - Obstetrics   Sudha Zhou 36 y o  female MRN: [id number]  Unit/Bed#: -01 Encounter: [id number]      History of Present Illness     Chief Complaint: Induction of labor    HPI:  Sudha Zhou is a 36 y o   female with an SOFIA of 2019, by Last Menstrual Period at 39w0d weeks gestation who is being admitted for induction of labor with indication of gestational hypertension  Contractions: no  Loss of fluid: no  Vaginal bleeding: no  Fetal movement:  Good    She is [facility]  PREGNANCY COMPLICATIONS:   Patient Active Problem List   Diagnosis    Heart burn    39 weeks gestation of pregnancy    Encounter for supervision of normal first pregnancy in third trimester    Gestational (pregnancy-induced) hypertension without significant proteinuria, complicating childbirth       OB History    Para Term  AB Living   1             SAB TAB Ectopic Multiple Live Births                  # Outcome Date GA Lbr Michel/2nd Weight Sex Delivery Anes PTL Lv   1 Current                Baby complications/comments:  None    Review of Systems   Constitutional: Negative  HENT: Negative  Respiratory: Negative  Cardiovascular: Negative  Gastrointestinal: Negative  Negative for abdominal distention  Genitourinary: Negative  Musculoskeletal: Negative  Neurological: Negative  Psychiatric/Behavioral: Negative  Historical Information   Past Medical History:   Diagnosis Date    Anxiety     Lactose intolerance     Shingles 2016    Varicella     childhood and shingles     No past surgical history on file    Social History   Social History     Substance and Sexual Activity   Alcohol Use Not Currently    Comment: socially prior to confirmed pregnancy     Social History     Substance and Sexual Activity   Drug Use Never     Social History     Tobacco Use   Smoking Status Never Smoker   Smokeless Tobacco Never Used     Family History: non-contributory    Meds/Allergies      Medications Prior to Admission   Medication    calcium carbonate (TUMS) 500 mg chewable tablet    ferrous sulfate 325 (65 Fe) mg tablet    omeprazole (PriLOSEC) 10 mg delayed release capsule    Prenatal MV-Min-Fe Fum-FA-DHA (PRENATAL+DHA) 28-0 975 & 200 MG MISC        Allergies   Allergen Reactions    Lactose Diarrhea     Very mild, Takes lactaid         OBJECTIVE:    Vitals: Blood pressure 140/84, pulse 103, temperature 98 4 °F (36 9 °C), temperature source Oral, resp  rate 16, height 5' 10" (1 778 m), weight 95 3 kg (210 lb), last menstrual period 12/13/2018, currently breastfeeding  Body mass index is 30 13 kg/m²  Physical Exam   Constitutional: She is oriented to person, place, and time  She appears well-developed and well-nourished  No distress  Cardiovascular: Normal rate, regular rhythm and normal heart sounds  Exam reveals no friction rub  No murmur heard  Pulmonary/Chest: Effort normal and breath sounds normal  No stridor  No respiratory distress  She has no wheezes  Abdominal: Soft  Musculoskeletal: Normal range of motion  Neurological: She is alert and oriented to person, place, and time  Skin: Skin is warm  She is not diaphoretic  Psychiatric: She has a normal mood and affect  Her behavior is normal          [place]:  Deferred  Nitrazine:  Deferred    Cervix:  Dilation: 1  Effacement (%): 50  Station: -2  Cervical Characteristics: Anterior    Fetal heart rate:   Baseline Rate: 130 bpm  FHR Category: Category I    [place]:   Contraction Frequency (minutes): 8-9  Contraction Duration (seconds): 3-4  Contraction Quality: Mild    EFW: 7lbs    GBS:  Negative    Prenatal Labs: I have personally reviewed pertinent reports    , Blood Type:   Lab Results   Component Value Date/Time    ABO Grouping A 02/18/2019 10:50 AM     , D (Rh type):   Lab Results   Component Value Date/Time    Rh Type Positive 02/18/2019 10:50 AM     , Antibody Screen: No results found for: ANTIBODYSCR , HCT/HGB:   Lab Results   Component Value Date/Time    HCT 32 0 (L) 2019 10:19 AM    Hemoglobin 11 2 2019 10:19 AM    External Hemoglobin 11 2 2019      , MCV:   Lab Results   Component Value Date/Time    MCV 87 2019 10:19 AM      , Platelets:   Lab Results   Component Value Date/Time    Platelet Count 418  10:19 AM    External Platelets 946       , 1 hour Glucola:   Lab Results   Component Value Date/Time    Glucose 114 2019 11:54 AM   , 3 hour GTT: No results found for: BTGOBRI0ER, Varicella: No results found for: VARICELLAIGG    , Rubella: No results found for: RUBELLAIGGQT     , VDRL/RPR:   Lab Results   Component Value Date/Time    RPR Non Reactive 2019 11:54 AM      , Urine Culture/Screen: No results found for: URINECX    , Urine Drug Screen: No results found for: AMPHETUR, BARBTUR, BDZUR, THCUR, COCAINEUR, METHADONEUR, OPIATEUR, PCPUR, MTHAMUR, ECSTASYUR, TRICYCLICSUR, Hep B:   Lab Results   Component Value Date/Time    Hepatitis B Surface Ag neg 2019     , Hep C: No components found for: HEPCSAG, EXTHEPCSAG   , HIV: No results found for: HIVAGAB  , Chlamydia: No results found for: EXTCHLAMYDIA  , Gonorrhea: No results found for: LABNGO  , Group B Strep:    Lab Results   Component Value Date/Time    Strep Grp B JODI Negative 2019 08:57 AM          Invasive Devices     None                   Assessment/Plan     ASSESSMENT:  31yo  at 39w0d weeks gestation who is being admitted for induction of labor  PLAN:   1) Admit   2) CBC, RPR, Blood Type   3) Start with Pitocin titration with Santiago balloon insertion   4) GBS negative status:  No PCN for prophylaxis    5) Analgesia and/or epidural at patient request   6) Anticipate    7) Discussed with Dr Hayde Beard      This patient will be an INPATIENT  and I certify the anticipated length of stay is >2 Midnights      Dai Pantoja MD    91:02 AM

## 2019-09-12 NOTE — PLAN OF CARE
Problem: PAIN - ADULT  Goal: Verbalizes/displays adequate comfort level or baseline comfort level  Description  Interventions:  - Encourage patient to monitor pain and request assistance  - Assess pain using appropriate pain scale  - Administer analgesics based on type and severity of pain and evaluate response  - Implement non-pharmacological measures as appropriate and evaluate response  - Consider cultural and social influences on pain and pain management  - Notify physician/advanced practitioner if interventions unsuccessful or patient reports new pain  Outcome: Progressing     Problem: INFECTION - ADULT  Goal: Absence or prevention of progression during hospitalization  Description  INTERVENTIONS:  - Assess and monitor for signs and symptoms of infection  - Monitor lab/diagnostic results  - Monitor all insertion sites, i e  indwelling lines, tubes, and drains  - Instruct and encourage patient and family to use good hand hygiene technique     Outcome: Progressing  Goal: Absence of fever/infection during neutropenic period  Description  INTERVENTIONS:  - Monitor WBC    Outcome: Progressing     Problem: SAFETY ADULT  Goal: Patient will remain free of falls  Description  INTERVENTIONS:  - Assess patient frequently for physical needs  -  Identify cognitive and physical deficits and behaviors that affect risk of falls    -  [place] fall precautions as indicated by assessment   - Educate patient/family on patient safety including physical limitations  - Instruct patient to call for assistance with activity based on assessment  - Modify environment to reduce risk of injury  - Consider OT/PT consult to assist with strengthening/mobility  Outcome: Progressing  Goal: Maintain or return to baseline ADL function  Description  INTERVENTIONS:  -  Assess patient's ability to carry out ADLs; assess patient's baseline for ADL function and identify physical deficits which impact ability to perform ADLs (bathing, care of mouth/teeth, toileting, grooming, dressing, etc )     Outcome: Progressing  Goal: Maintain or return mobility status to optimal level  Description  INTERVENTIONS:  - Assess patient's baseline mobility status (ambulation, transfers, stairs, etc )       Outcome: Progressing     Problem: Knowledge Deficit  Goal: Patient/family/caregiver demonstrates understanding of disease process, treatment plan, medications, and discharge instructions  Description  Complete learning assessment and assess knowledge base  Interventions:  - Provide teaching at level of understanding  - Provide teaching via preferred learning methods  Outcome: Progressing  Goal: Verbalizes understanding of labor plan  Description  Assess patient/family/caregiver's baseline knowledge level and ability to understand information  Provide education via patient/family/caregiver's preferred learning method at appropriate level of understanding  1  Provide teaching at level of understanding  2  Provide teaching via preferred learning method(s)    Outcome: Progressing     Problem: DISCHARGE PLANNING  Goal: Discharge to home or other facility with appropriate resources  Description  INTERVENTIONS:  - Identify barriers to discharge w/patient and caregiver  - Arrange for needed discharge resources and transportation as appropriate  - Identify discharge learning needs (meds, wound care, etc )  - Arrange for interpretive services to assist at discharge as needed     Outcome: Progressing     Problem: BIRTH - VAGINAL/ SECTION  Goal: Fetal and maternal status remain reassuring during the birth process  Description  INTERVENTIONS:  - Monitor vital signs  - Monitor fetal heart rate  - Monitor uterine activity  - Monitor labor progression (vaginal delivery)  - DVT prophylaxis     Outcome: Progressing  Goal: Emotionally satisfying birthing experience for mother/fetus  Description  Interventions:  - Assess, plan, implement and evaluate the nursing care given to the patient in labor  - Advocate the philosophy that each childbirth experience is a unique experience and support the family's chosen level of involvement and control during the labor process   - Actively participate in both the patient's and family's teaching of the birth process  - Consider cultural, Anabaptism and age-specific factors and plan care for the patient in labor  Outcome: Progressing     Problem: Labor & Delivery  Goal: Manages discomfort  Description  Assess and monitor for signs and symptoms of discomfort  Assess patient's pain level regularly and per hospital policy  Administer medications as ordered  Support use of nonpharmacological methods to help control pain such as distraction, imagery, relaxation, and application of heat and cold  Collaborate with interdisciplinary team and patient to determine appropriate pain management plan  1  Include patient in decisions related to comfort  2  Offer non-pharmacological pain management interventions  3  Report ineffective pain management to physician  Outcome: Progressing  Goal: Patient vital signs are stable  Description  1  Assess vital signs - vaginal delivery    Outcome: Progressing

## 2019-09-12 NOTE — PROGRESS NOTES
Santiago placement    Pt in dosal lithotomy     Speculum placed and cervix visualized  Santiago placed without difficulty  Balloon inflated with 60cc NS  Speculum removed  Pt tolerated procedure well

## 2019-09-12 NOTE — OB LABOR/OXYTOCIN SAFETY PROGRESS
Oxytocin Safety Progress Check Note - Terrance Chi 36 y o  female MRN: [id number]    Unit/Bed#: -01 Encounter: [id number]       Contraction Frequency (minutes): 8-9  Contraction Quality: Mild      Dilation: 1        Effacement (%): 50  Station: -2  Baseline Rate: 130 bpm     FHR Category: Category I             Notes/comments:     Patient is comfortable, she will receive a induction with Pitocin with occasional gestational hypertension    Pitocin titration will be starting with bleeding Santiago balloon insertion fetal heart tone 130 category 1 toco be a 9 minutes  Thom Martinez MD 2/19/8430 17:62 AM

## 2019-09-12 NOTE — PROGRESS NOTES
This is a 36 y o   at 39w0d who presents for return OB visit  No complaints  Denies contractions, leakage, bleeding  Endorses fetal movement  BP: 158/90, repeat 144/92 TWlb  GHTN: Dx today based on prior elevated BP at 36 wks  Sent to L&D for delivery  Charge nurse notified    F/up postpartum

## 2019-09-12 NOTE — OB LABOR/OXYTOCIN SAFETY PROGRESS
Oxytocin Safety Progress Check Note - Malcolm Patiño 36 y o  female MRN: [id number]    Unit/Bed#: -01 Encounter: [id number]    Dose (tootie-units/min) Oxytocin: 8 tootie-units/min  Contraction Frequency (minutes): 2-3  Contraction Quality: Mild  Tachysystole: No   Dilation: 5        Effacement (%): 50  Station: -1  Baseline Rate: 140 bpm  Fetal Heart Rate: 140 BPM  FHR Category: Category I  Oxytocin Safety Progress Check: Safety check completed          Notes/comments: Patient strip reviewed and not concerning for fetal acidemia  Will continue to monitor  Cervical check deferred for now        Taisha Andrade MD 9/12/2019 6:39 PM

## 2019-09-13 PROBLEM — O14.00 PRE-ECLAMPSIA, MILD: Status: ACTIVE | Noted: 2019-09-12

## 2019-09-13 LAB
BASE EXCESS BLDCOA CALC-SCNC: -8.1 MMOL/L (ref 3–11)
BASE EXCESS BLDCOV CALC-SCNC: -6.1 MMOL/L (ref 1–9)
HCO3 BLDCOA-SCNC: 21.5 MMOL/L (ref 17.3–27.3)
HCO3 BLDCOV-SCNC: 21.8 MMOL/L (ref 12.2–28.6)
O2 CT VFR BLDCOA CALC: 6.8 ML/DL
OXYHGB MFR BLDCOA: 29.8 %
OXYHGB MFR BLDCOV: 27.9 %
PCO2 BLDCOA: 60.7 MM[HG] (ref 30–60)
PCO2 BLDCOV: 51.6 MM HG (ref 27–43)
PH BLDCOA: 7.17 [PH] (ref 7.23–7.43)
PH BLDCOV: 7.24 [PH] (ref 7.19–7.49)
PO2 BLDCOA: 18.6 MM HG (ref 5–25)
PO2 BLDCOV: 16.3 MM HG (ref 15–45)
SAO2 % BLDCOV: 6.3 ML/DL

## 2019-09-13 PROCEDURE — 99024 POSTOP FOLLOW-UP VISIT: CPT | Performed by: OBSTETRICS & GYNECOLOGY

## 2019-09-13 PROCEDURE — 59400 OBSTETRICAL CARE: CPT | Performed by: OBSTETRICS & GYNECOLOGY

## 2019-09-13 PROCEDURE — 0KQM0ZZ REPAIR PERINEUM MUSCLE, OPEN APPROACH: ICD-10-PCS | Performed by: OBSTETRICS & GYNECOLOGY

## 2019-09-13 PROCEDURE — 82805 BLOOD GASES W/O2 SATURATION: CPT | Performed by: OBSTETRICS & GYNECOLOGY

## 2019-09-13 RX ORDER — DIPHENHYDRAMINE HCL 25 MG
25 TABLET ORAL EVERY 6 HOURS PRN
Status: DISCONTINUED | OUTPATIENT
Start: 2019-09-13 | End: 2019-09-15 | Stop reason: HOSPADM

## 2019-09-13 RX ORDER — OXYCODONE HYDROCHLORIDE 5 MG/1
5 TABLET ORAL EVERY 4 HOURS PRN
Status: DISCONTINUED | OUTPATIENT
Start: 2019-09-13 | End: 2019-09-15 | Stop reason: HOSPADM

## 2019-09-13 RX ORDER — FERROUS SULFATE 325(65) MG
325 TABLET ORAL
Status: DISCONTINUED | OUTPATIENT
Start: 2019-09-13 | End: 2019-09-15 | Stop reason: HOSPADM

## 2019-09-13 RX ORDER — ACETAMINOPHEN 325 MG/1
650 TABLET ORAL EVERY 6 HOURS PRN
Status: DISCONTINUED | OUTPATIENT
Start: 2019-09-13 | End: 2019-09-15 | Stop reason: HOSPADM

## 2019-09-13 RX ORDER — SIMETHICONE 80 MG
80 TABLET,CHEWABLE ORAL 4 TIMES DAILY PRN
Status: DISCONTINUED | OUTPATIENT
Start: 2019-09-13 | End: 2019-09-15 | Stop reason: HOSPADM

## 2019-09-13 RX ORDER — OXYTOCIN/RINGER'S LACTATE 30/500 ML
250 PLASTIC BAG, INJECTION (ML) INTRAVENOUS CONTINUOUS
Status: DISCONTINUED | OUTPATIENT
Start: 2019-09-13 | End: 2019-09-15 | Stop reason: HOSPADM

## 2019-09-13 RX ORDER — ONDANSETRON 2 MG/ML
4 INJECTION INTRAMUSCULAR; INTRAVENOUS EVERY 8 HOURS PRN
Status: DISCONTINUED | OUTPATIENT
Start: 2019-09-13 | End: 2019-09-15 | Stop reason: HOSPADM

## 2019-09-13 RX ORDER — DOCUSATE SODIUM 100 MG/1
100 CAPSULE, LIQUID FILLED ORAL 2 TIMES DAILY
Status: DISCONTINUED | OUTPATIENT
Start: 2019-09-13 | End: 2019-09-15 | Stop reason: HOSPADM

## 2019-09-13 RX ORDER — CALCIUM CARBONATE 200(500)MG
1000 TABLET,CHEWABLE ORAL DAILY PRN
Status: DISCONTINUED | OUTPATIENT
Start: 2019-09-13 | End: 2019-09-15 | Stop reason: HOSPADM

## 2019-09-13 RX ORDER — IBUPROFEN 600 MG/1
600 TABLET ORAL EVERY 6 HOURS PRN
Status: DISCONTINUED | OUTPATIENT
Start: 2019-09-13 | End: 2019-09-15 | Stop reason: HOSPADM

## 2019-09-13 RX ORDER — DIAPER,BRIEF,INFANT-TODD,DISP
1 EACH MISCELLANEOUS AS NEEDED
Status: DISCONTINUED | OUTPATIENT
Start: 2019-09-13 | End: 2019-09-15 | Stop reason: HOSPADM

## 2019-09-13 RX ADMIN — DOCUSATE SODIUM 100 MG: 100 CAPSULE, LIQUID FILLED ORAL at 08:34

## 2019-09-13 RX ADMIN — HYDROCORTISONE 1 APPLICATION: 1 CREAM TOPICAL at 08:34

## 2019-09-13 RX ADMIN — IBUPROFEN 600 MG: 600 TABLET, FILM COATED ORAL at 14:36

## 2019-09-13 RX ADMIN — IBUPROFEN 600 MG: 600 TABLET, FILM COATED ORAL at 08:34

## 2019-09-13 RX ADMIN — DOCUSATE SODIUM 100 MG: 100 CAPSULE, LIQUID FILLED ORAL at 18:06

## 2019-09-13 RX ADMIN — WITCH HAZEL 1 PAD: 500 SOLUTION RECTAL; TOPICAL at 08:34

## 2019-09-13 RX ADMIN — ROPIVACAINE HYDROCHLORIDE: 2 INJECTION, SOLUTION EPIDURAL; INFILTRATION at 01:39

## 2019-09-13 RX ADMIN — BENZOCAINE AND LEVOMENTHOL: 200; 5 SPRAY TOPICAL at 08:34

## 2019-09-13 RX ADMIN — PRENATAL VIT W/ FE FUMARATE-FA TAB 27-0.8 MG 1 TABLET: 27-0.8 TAB at 08:34

## 2019-09-13 NOTE — ANESTHESIA POSTPROCEDURE EVALUATION
Post-Op Assessment Note    CV Status:  Stable  Pain Score: 0    Pain management: adequate     Mental Status:  Alert and awake   Hydration Status:  Euvolemic   PONV Controlled:  Controlled   Airway Patency:  Patent   Post Op Vitals Reviewed: Yes      Staff: Anesthesiologist     Post-op block assessment: no complications        BP      Temp      Pulse     Resp      SpO2

## 2019-09-13 NOTE — OB LABOR/OXYTOCIN SAFETY PROGRESS
Oxytocin Safety Progress Check Note - Molly Collins 36 y o  female MRN: [id number]    Unit/Bed#: -01 Encounter: [id number]    Dose (tootie-units/min) Oxytocin: 10 tootie-units/min  Contraction Frequency (minutes): 2-3  Contraction Quality: Moderate  Tachysystole: No   Dilation: 5-6        Effacement (%): 70  Station: -2  Baseline Rate: 120 bpm  Fetal Heart Rate: 132 BPM  FHR Category: Category I  Oxytocin Safety Progress Check: Safety check completed          Notes/comments:   Comfortable with epidural  IUPC place - pitocin titration to -250  Saima Travis MD 9/12/2019 11:37 PM

## 2019-09-13 NOTE — OB LABOR/OXYTOCIN SAFETY PROGRESS
Oxytocin Safety Progress Check Note - Nirav Lopez 36 y o  female MRN: [id number]    Unit/Bed#: -01 Encounter: [id number]    Dose (tootie-units/min) Oxytocin: 16 tootie-units/min  Contraction Frequency (minutes): 2-3 5  Contraction Quality: Moderate  Tachysystole: No   Dilation: 8        Effacement (%): 90  Station: -1  Baseline Rate: 130 bpm  Fetal Heart Rate: 150 BPM  FHR Category: Category I  Oxytocin Safety Progress Check: Safety check completed          Notes/comments:   Some left sided discomfort  Continue pitocin titration    Davion Garza MD 9/13/2019 1:30 AM

## 2019-09-13 NOTE — OB LABOR/OXYTOCIN SAFETY PROGRESS
Labor Progress Note - Wandy iGll 36 y o  female MRN: [id number]    Unit/Bed#: -01 Encounter: [id number]    Dose (tootie-units/min) Oxytocin: 0 tootie-units/min  Contraction Frequency (minutes): 2-3  Contraction Quality: Moderate  Tachysystole: No   Dilation: 5-6        Effacement (%): 70  Station: -1  Baseline Rate: 125 bpm  Fetal Heart Rate: (pt sitting up on side of bed  tacing maternal hr)  FHR Category: Category II  Oxytocin Safety Progress Check: Safety check completed          Notes/comments:     Hypotension after epidural - improved with fluids  IUPC attempted to be placed - bloody fluid  2 min decel to 80's  Pt repositioned and FHT returned to baseline  [address], MD 9/12/2019 9:33 PM

## 2019-09-13 NOTE — LACTATION NOTE
This note was copied from a baby's chart  Met with mother  Provided mother with Ready, Set, Baby booklet  Discussed Skin to Skin contact an benefits to mom and baby  Talked about the delay of the first bath until baby has adjusted  Spoke about the benefits of rooming in  Feeding on cue and what that means for recognizing infant's hunger  Avoidance of pacifiers for the first month discussed  Talked about exclusive breastfeeding for the first 6 months  Positioning and latch reviewed as well as showing images of other feeding positions  Discussed the properties of a good latch in any position  Reviewed hand/manual expression  Discussed s/s that baby is getting enough milk and some s/s that breastfeeding dyad may need further help  Gave information on common concerns, what to expect the first few weeks after delivery, preparing for other caregivers, and how partners can help  Resources for support also provided  Encouraged parents to call for assistance, questions, and concerns about breastfeeding  Extension provided

## 2019-09-13 NOTE — PLAN OF CARE
Problem: PAIN - ADULT  Goal: Verbalizes/displays adequate comfort level or baseline comfort level  Description  Interventions:  - Encourage patient to monitor pain and request assistance  - Assess pain using appropriate pain scale  - Administer analgesics based on type and severity of pain and evaluate response  - Implement non-pharmacological measures as appropriate and evaluate response  - Consider cultural and social influences on pain and pain management  - Notify physician/advanced practitioner if interventions unsuccessful or patient reports new pain  9/13/2019 0847 by Sarah Liz RN  Outcome: Progressing  9/13/2019 0845 by Sarah Liz RN  Outcome: Progressing     Problem: INFECTION - ADULT  Goal: Absence or prevention of progression during hospitalization  Description  INTERVENTIONS:  - Assess and monitor for signs and symptoms of infection  - Monitor lab/diagnostic results  - Monitor all insertion sites, i e  indwelling lines, tubes, and drains  - Instruct and encourage patient and family to use good hand hygiene technique     9/13/2019 0847 by Sarah Liz RN  Outcome: Progressing  9/13/2019 0845 by Sarah Liz RN  Outcome: Progressing  Goal: Absence of fever/infection during neutropenic period  Description  INTERVENTIONS:  - Monitor WBC    9/13/2019 0847 by Sarah Liz RN  Outcome: Progressing  9/13/2019 0845 by Sarah Liz RN  Outcome: Progressing     Problem: SAFETY ADULT  Goal: Patient will remain free of falls  Description  INTERVENTIONS:  - Assess patient frequently for physical needs  -  Identify cognitive and physical deficits and behaviors that affect risk of falls    -  [place] fall precautions as indicated by assessment   - Educate patient/family on patient safety including physical limitations  - Instruct patient to call for assistance with activity based on assessment  - Modify environment to reduce risk of injury  - Consider OT/PT consult to assist with strengthening/mobility  9/13/2019 0847 by Sandra Madsen RN  Outcome: Progressing  9/13/2019 0845 by Sandra Madsen RN  Outcome: Progressing  Goal: Maintain or return to baseline ADL function  Description  INTERVENTIONS:  -  Assess patient's ability to carry out ADLs; assess patient's baseline for ADL function and identify physical deficits which impact ability to perform ADLs (bathing, care of mouth/teeth, toileting, grooming, dressing, etc )     9/13/2019 0847 by Sandra Madsen RN  Outcome: Progressing  9/13/2019 0845 by Sandra Madsen RN  Outcome: Progressing  Goal: Maintain or return mobility status to optimal level  Description  INTERVENTIONS:  - Assess patient's baseline mobility status (ambulation, transfers, stairs, etc )       9/13/2019 0847 by Sandra Madsen RN  Outcome: Progressing  9/13/2019 0845 by Sandra Madsen RN  Outcome: Progressing     Problem: Knowledge Deficit  Goal: Patient/family/caregiver demonstrates understanding of disease process, treatment plan, medications, and discharge instructions  Description  Complete learning assessment and assess knowledge base  Interventions:  - Provide teaching at level of understanding  - Provide teaching via preferred learning methods  9/13/2019 0847 by Sandra Madsen RN  Outcome: Progressing  9/13/2019 0845 by Sandra Madsen RN  Outcome: Progressing  Goal: Verbalizes understanding of labor plan  Description  Assess patient/family/caregiver's baseline knowledge level and ability to understand information  Provide education via patient/family/caregiver's preferred learning method at appropriate level of understanding  1  Provide teaching at level of understanding  2  Provide teaching via preferred learning method(s)    9/13/2019 0847 by Sandra Madsen RN  Outcome: Progressing  9/13/2019 0845 by Sandra Madsen RN  Outcome: Progressing     Problem: DISCHARGE PLANNING  Goal: Discharge to home or other facility with appropriate resources  Description  INTERVENTIONS:  - Identify barriers to discharge w/patient and caregiver  - Arrange for needed discharge resources and transportation as appropriate  - Identify discharge learning needs (meds, wound care, etc )  - Arrange for interpretive services to assist at discharge as needed     2019 by Chelsey Charles RN  Outcome: Progressing  2019 by Chelsey Charles RN  Outcome: Progressing     Problem: BIRTH - VAGINAL/ SECTION  Goal: Fetal and maternal status remain reassuring during the birth process  Description  INTERVENTIONS:  - Monitor vital signs  - Monitor fetal heart rate  - Monitor uterine activity  - Monitor labor progression (vaginal delivery)  - DVT prophylaxis     2019 by Chelsey Charles RN  Outcome: Progressing  2019 by Chelsey Charles RN  Outcome: Progressing  Goal: Emotionally satisfying birthing experience for mother/fetus  Description  Interventions:  - Assess, plan, implement and evaluate the nursing care given to the patient in labor  - Advocate the philosophy that each childbirth experience is a unique experience and support the family's chosen level of involvement and control during the labor process   - Actively participate in both the patient's and family's teaching of the birth process  - Consider cultural, Orthodoxy and age-specific factors and plan care for the patient in labor  2019 by Chelsey Charles RN  Outcome: Progressing  2019 by Chelsey Charles RN  Outcome: Progressing     Problem: Labor & Delivery  Goal: Manages discomfort  Description  Assess and monitor for signs and symptoms of discomfort  Assess patient's pain level regularly and per hospital policy  Administer medications as ordered  Support use of nonpharmacological methods to help control pain such as distraction, imagery, relaxation, and application of heat and cold    Collaborate with interdisciplinary team and patient to determine appropriate pain management plan  1  Include patient in decisions related to comfort  2  Offer non-pharmacological pain management interventions  3  Report ineffective pain management to physician  201947 by Rafael Canela RN  Outcome: Progressing  201945 by Rafael Canela RN  Outcome: Progressing  Goal: Patient vital signs are stable  Description  1  Assess vital signs - vaginal delivery    2019 0847 by Rafael Canela RN  Outcome: Progressing  201945 by Rafael Canela RN  Outcome: Progressing     Problem: POSTPARTUM  Goal: Experiences normal postpartum course  Description  INTERVENTIONS:  - Monitor maternal vital signs  - Assess uterine involution and lochia  2019 by Rafael Canela RN  Outcome: Progressing  201945 by Rafael Canela RN  Outcome: Progressing  Goal: Appropriate maternal -  bonding  Description  INTERVENTIONS:  - Identify family support  - Assess for appropriate maternal/infant bonding   -Encourage maternal/infant bonding opportunities  - Referral to  or  as needed  2019 by Rafael Canela RN  Outcome: Progressing  2019 by Rafael Canela RN  Outcome: Progressing  Goal: Establishment of infant feeding pattern  Description  INTERVENTIONS:  - Assess breast/bottle feeding  - Refer to lactation as needed  2019 by Rafael Canela RN  Outcome: Progressing  2019 by Rafael Canela RN  Outcome: Progressing  Goal: Incision(s), wounds(s) or drain site(s) healing without S/S of infection  Description  INTERVENTIONS  - Assess and document risk factors for skin impairment   - Assess and document dressing, incision, wound bed, drain sites and surrounding tissue  - Consider nutrition services referral as needed  - Oral mucous membranes remain intact  - Provide patient/ family education  2019 by Rafael Canela RN  Outcome: Progressing  2019 by Rafael Canela RN  Outcome: Progressing

## 2019-09-13 NOTE — ANESTHESIA PREPROCEDURE EVALUATION
Review of Systems/Medical History  Patient summary reviewed        Cardiovascular  Hypertension ,    Pulmonary  Negative pulmonary ROS        GI/Hepatic  Negative GI/hepatic ROS          Negative  ROS        Endo/Other  Negative endo/other ROS      GYN  Negative gynecology ROS          Hematology  Negative hematology ROS      Musculoskeletal  Negative musculoskeletal ROS        Neurology  Negative neurology ROS      Psychology   Anxiety,              Physical Exam    Airway    Mallampati score: I  TM Distance: >3 FB  Neck ROM: full     Dental       Cardiovascular  Cardiovascular exam normal    Pulmonary  Pulmonary exam normal     Other Findings        Anesthesia Plan  ASA Score- 2     Anesthesia Type- epidural with ASA Monitors  Additional Monitors:   Airway Plan:         Plan Factors-    Induction- intravenous  Postoperative Plan-     Informed Consent- Anesthetic plan and risks discussed with patient  I personally reviewed this patient with the CRNA  Discussed and agreed on the Anesthesia Plan with the CRNA  Marcus Segura

## 2019-09-13 NOTE — DISCHARGE SUMMARY
Discharge Summary - Wandy Gill 36 y o  female MRN: [id number]    Unit/Bed#: -01 Encounter: [id number]    Admission Date: 2019     Discharge Date: 9/15/19    Admitting Diagnosis:   1  Pregnancy at 39w1d  2  gHTN  3  Induced labor    Discharge Diagnosis: same, delivered  Pre-eclampsia without severe features    Procedures: spontaneous vaginal delivery and repair of 2nd degree laceration    Admitting and Delivering Attending: Jona Reed MD  Discharging Attending: Dr Serina Arevalo Course:     Wandy Gill is a 36 y o   at 39w1d wks who was initially admitted for induction of labor for gestational HTN  Pre-E labs were drawn on admission and notable for P/C ratio of 0 32, providing the diagnosis of Pre-Eclampsia without severe features  Initial SVE 1/50/-2  Cervical ripening was performed with lópez balloon and IV pitocin  After lópez balloon was expelled, SVE 5/50/-1  Amniotomy was performed at that time (1558) for clear fluid  She received an epidural for anesthesia  Post-epidural placement, she experienced hypotension and FHT demonstrated a 2 minute deceleration to the 80s that resolved with increased IVFs and maternal repositioning  An IUPC was placed to titrate pitocin to MVUS of 200-250  Pt continued to make cervical change and progressed to complete dilation at 0310  She began pushing at 0313  She delivered a viable female  on 19 at 65  Weight 8lbs 10oz via spontaneous vaginal delivery  Apgars were 8 (1 min) and 9 (5 min)  Patient tolerated the procedure well and was transferred to recovery in stable condition  Her postpartum course was uncomplicated  Her postpartum pain was well controlled with oral analgesics  On day of discharge, she was ambulating and able to reasonably perform all ADLs  She was voiding and had appropriate bowel function  Pain was well controlled  She was discharged home on postpartum day #2 without complications   Patient was instructed to follow up with her OB as an outpatient and was given appropriate warnings to call provider if she develops signs of infection or uncontrolled pain  Complications: none apparent    Condition at discharge: good      Discharge Medications:   Prenatal vitamin daily for 6 months or the duration of nursing whichever is longer  Motrin 600 mg orally every 6 hours as needed for pain  Tylenol (over the counter) per bottle directions as needed for pain  Hydrocortisone cream 1% (over the counter) applied 1-2x daily to hemorrhoids as needed  Witch hazel pads for hemorrhoidal discomfort as needed    Discharge instructions: See after visit summary for complete information  Do not place anything (no partner, tampons or douche) in your vagina for 6 weeks  You may walk for exercise for the first 6 weeks then gradually return to your usual activities     Please do not drive for 1 week if you have no stitches and for 2 weeks if you have stitches or underwent a  delivery     You may take baths or shower per your preference     Please look at your bust (breasts) in the mirror daily and call for redness or tenderness or increased warmth     If you have had a  please look at your incision daily as well and call us for increasing redness or steady drainage from the incision     Please call us for temperature > 100 4*F or 38* C, worsening pain or a foul discharge      Disposition: Home    Planned Readmission: No

## 2019-09-13 NOTE — DISCHARGE INSTRUCTIONS
Preeclampsia and Eclampsia After Delivery   WHAT YOU NEED TO KNOW:   What are preeclampsia and eclampsia? Preeclampsia is high blood pressure during pregnancy  You may also have protein in your urine  Preeclampsia can lead to eclampsia, a condition that causes one or more seizures  These conditions usually occur during pregnancy, but they can also occur up to 6 weeks after delivery  The risk is highest within a week after delivery  How are preeclampsia and eclampsia treated? Medicines may be given to lower your blood pressure, protect your organs, or prevent seizures  What do I need to know if I had preeclampsia or eclampsia during my pregnancy? Most of the time, preeclampsia and eclampsia go away after you deliver  You may continue to have symptoms for a period of time after you deliver  If you had severe preeclampsia during pregnancy, you may  need to do any of the following:  · See your healthcare provider several times during the week after delivery  He may check your blood pressure and order other tests  Your healthcare provider may ask you to check your blood pressure at home  · Continue to take certain medicines  for up to 6 weeks after delivery  Call 911 for the following: You have a seizure  When should I seek care immediately? · You develop a severe headache that does not go away  · You have new or increased vision changes, such as blurred or spotted vision  · You have new or increased swelling in your face or hands  · You have severe abdominal pain with nausea and vomiting  When should I contact my healthcare provider? · Your blood pressure is higher than you were told it should be  · You have questions or concerns about your condition or care  CARE AGREEMENT:   You have the right to help plan your care  Learn about your health condition and how it may be treated  Discuss treatment options with your caregivers to decide what care you want to receive   You always have the right to refuse treatment  The above information is an  only  It is not intended as medical advice for individual conditions or treatments  Talk to your doctor, nurse or pharmacist before following any medical regimen to see if it is safe and effective for you  © 2017 [employer] Information is for End User's use only and may not be sold, redistributed or otherwise used for commercial purposes  All illustrations and images included in CareNotes® are the copyrighted property of A D [employer] M [employer]  or [employer]

## 2019-09-13 NOTE — ANESTHESIA PROCEDURE NOTES
Epidural Block    Patient location during procedure: OB  Start time: 9/12/2019 8:52 PM  Reason for block: procedure for pain and at surgeon's request  Staffing  Anesthesiologist: Ephraim Marrero MD  Performed: anesthesiologist   Preanesthetic Checklist  Completed: patient identified, site marked, surgical consent, pre-op evaluation, timeout performed, IV checked, risks and benefits discussed and monitors and equipment checked  Epidural  Patient position: sitting  Prep: Betadine and ChloraPrep  Patient monitoring: frequent blood pressure checks and continuous pulse ox  Approach: midline  Location: lumbar (1-5)  Injection technique: CRISTEL air  Needle  Needle type: Tuohy   Needle gauge: 18 G  Catheter type: side hole  Catheter size: 20 G  Catheter at skin depth: 11 cm  Test dose: negativelidocaine 1 5% with epinephrine 1:200,000 test dose, 5 mL  lidocaine (XYLOCAINE) 1 % infiltration, 2 mL  ropivacaine (NAROPIN) 0 2% epidural injection, 7 mL  Assessment  Sensory level: B80hiudnasp aspiration for CSF, negative aspiration for heme and no paresthesia on injection  patient tolerated the procedure well with no immediate complications

## 2019-09-13 NOTE — PLAN OF CARE
Problem: PAIN - ADULT  Goal: Verbalizes/displays adequate comfort level or baseline comfort level  Description  Interventions:  - Encourage patient to monitor pain and request assistance  - Assess pain using appropriate pain scale  - Administer analgesics based on type and severity of pain and evaluate response  - Implement non-pharmacological measures as appropriate and evaluate response  - Consider cultural and social influences on pain and pain management  - Notify physician/advanced practitioner if interventions unsuccessful or patient reports new pain  Outcome: Progressing     Problem: INFECTION - ADULT  Goal: Absence or prevention of progression during hospitalization  Description  INTERVENTIONS:  - Assess and monitor for signs and symptoms of infection  - Monitor lab/diagnostic results  - Monitor all insertion sites, i e  indwelling lines, tubes, and drains  - Instruct and encourage patient and family to use good hand hygiene technique     Outcome: Progressing  Goal: Absence of fever/infection during neutropenic period  Description  INTERVENTIONS:  - Monitor WBC    Outcome: Progressing     Problem: SAFETY ADULT  Goal: Patient will remain free of falls  Description  INTERVENTIONS:  - Assess patient frequently for physical needs  -  Identify cognitive and physical deficits and behaviors that affect risk of falls    -  [place] fall precautions as indicated by assessment   - Educate patient/family on patient safety including physical limitations  - Instruct patient to call for assistance with activity based on assessment  - Modify environment to reduce risk of injury  - Consider OT/PT consult to assist with strengthening/mobility  Outcome: Progressing  Goal: Maintain or return to baseline ADL function  Description  INTERVENTIONS:  -  Assess patient's ability to carry out ADLs; assess patient's baseline for ADL function and identify physical deficits which impact ability to perform ADLs (bathing, care of mouth/teeth, toileting, grooming, dressing, etc )     Outcome: Progressing  Goal: Maintain or return mobility status to optimal level  Description  INTERVENTIONS:  - Assess patient's baseline mobility status (ambulation, transfers, stairs, etc )       Outcome: Progressing     Problem: Knowledge Deficit  Goal: Patient/family/caregiver demonstrates understanding of disease process, treatment plan, medications, and discharge instructions  Description  Complete learning assessment and assess knowledge base  Interventions:  - Provide teaching at level of understanding  - Provide teaching via preferred learning methods  Outcome: Progressing  Goal: Verbalizes understanding of labor plan  Description  Assess patient/family/caregiver's baseline knowledge level and ability to understand information  Provide education via patient/family/caregiver's preferred learning method at appropriate level of understanding  1  Provide teaching at level of understanding  2  Provide teaching via preferred learning method(s)    Outcome: Progressing     Problem: DISCHARGE PLANNING  Goal: Discharge to home or other facility with appropriate resources  Description  INTERVENTIONS:  - Identify barriers to discharge w/patient and caregiver  - Arrange for needed discharge resources and transportation as appropriate  - Identify discharge learning needs (meds, wound care, etc )  - Arrange for interpretive services to assist at discharge as needed     Outcome: Progressing     Problem: BIRTH - VAGINAL/ SECTION  Goal: Fetal and maternal status remain reassuring during the birth process  Description  INTERVENTIONS:  - Monitor vital signs  - Monitor fetal heart rate  - Monitor uterine activity  - Monitor labor progression (vaginal delivery)  - DVT prophylaxis     Outcome: Progressing  Goal: Emotionally satisfying birthing experience for mother/fetus  Description  Interventions:  - Assess, plan, implement and evaluate the nursing care given to the patient in labor  - Advocate the philosophy that each childbirth experience is a unique experience and support the family's chosen level of involvement and control during the labor process   - Actively participate in both the patient's and family's teaching of the birth process  - Consider cultural, Gnosticism and age-specific factors and plan care for the patient in labor  Outcome: Progressing     Problem: Labor & Delivery  Goal: Manages discomfort  Description  Assess and monitor for signs and symptoms of discomfort  Assess patient's pain level regularly and per hospital policy  Administer medications as ordered  Support use of nonpharmacological methods to help control pain such as distraction, imagery, relaxation, and application of heat and cold  Collaborate with interdisciplinary team and patient to determine appropriate pain management plan  1  Include patient in decisions related to comfort  2  Offer non-pharmacological pain management interventions  3  Report ineffective pain management to physician  Outcome: Progressing  Goal: Patient vital signs are stable  Description  1  Assess vital signs - vaginal delivery    Outcome: Progressing     Problem: POSTPARTUM  Goal: Experiences normal postpartum course  Description  INTERVENTIONS:  - Monitor maternal vital signs  - Assess uterine involution and lochia  Outcome: Progressing  Goal: Appropriate maternal -  bonding  Description  INTERVENTIONS:  - Identify family support  - Assess for appropriate maternal/infant bonding   -Encourage maternal/infant bonding opportunities  - Referral to  or  as needed  Outcome: Progressing  Goal: Establishment of infant feeding pattern  Description  INTERVENTIONS:  - Assess breast/bottle feeding  - Refer to lactation as needed  Outcome: Progressing  Goal: Incision(s), wounds(s) or drain site(s) healing without S/S of infection  Description  INTERVENTIONS  - Assess and document risk factors for skin impairment   - Assess and document dressing, incision, wound bed, drain sites and surrounding tissue  - Consider nutrition services referral as needed  - Oral mucous membranes remain intact  - Provide patient/ family education  Outcome: Progressing

## 2019-09-13 NOTE — L&D DELIVERY NOTE
Vaginal Delivery Summary - OB/GYN   Dae Marie 36 y o  female MRN: [id number]  Unit/Bed#: -01 Encounter: [id number]          Predelivery Diagnosis:  1  Pregnancy at 39 1 wks  2  Gestational hypertension    Postdelivery Diagnosis:  1  Same as above  2  Delivery of term     Procedure: normal spontaneous vaginal delivery    Attending: Erika Muhammad    Resident: Keya Cramer    Anesthesia: [place] - Epidural    QBL: [id number]    Complications: none apparent    Specimens: cord blood, arterial and venous cord blood gasses, placenta to storage, segment of cord    Findings:   1  Viable female at 0430, with APGARS of 8 and 9 at 1 and 5 minutes respectively, Weight not available at time of dictation  2  spontaneous at 0434  3  2 degree laceration repaired with 3 0 Vicryl, bilateral periurethral abrasions  4  Arterial Blood gas 7 168, BE -8 1  5  Venous Blood gas 7 243  BE -6 1    Disposition:  Patient tolerated the procedure well and was recovering in labor and delivery room     Brief history and labor course:  Ms Dae Marie is a 36 y o   at 39 1wks who presents to the office for her routine prenatal visit  While in the office she had a 2nd elevated blood pressure  She was sent to Labor and delivery for induction of labor secondary to gestational hypertension  She received a [place] balloon and was started on Pitocin for cervical ripening  When the fluid wound fell out she was 5/50/-1  She was artificially ruptured and received a epidural   She eventually progressed to complete dilation at 0310 and started pushing at 0313  During the 2nd stage of labor, perineal massage and warm compresses were used  Description of procedure    After pushing for 77 minutes, at 0430 patient delivered a viable female , apgars of 8 (1 min) and 9 (5 min)  The fetal vertex delivered spontaneously  There was no evidence for nuchal cord   The anterior should delivered atraumatically with maternal expulsive forces and the assistance of downward traction  The posterior shoulder delivered with maternal expulsive forces and the assistance of upward traction  The remainder of the fetus delivered spontaneously  Upon delivery, the infant was placed on the mothers abdomen and the cord was clamped and cut  Awaiting nurse resuscitators evaluated the   Arterial and venous cord blood gases and cord blood was collected for analysis  These were promptly sent to the lab  In the immediate post-partum, 30 units of IV pitocin was administered, and the uterus was noted to contract down well with massage and pitocin  The placenta delivered spontaneously at 0434 and was noted to have a centrally inserted 3 vessel cord  The uterus was massaged until firm and the lower uterine segment was cleared of all clot and debris  The vagina, cervix, perineum, and rectum were inspected and there was noted to be a a second-degree perineal laceration and bilateral janneth urethral abrasions  The second-degree laceration was repaired in the standard fashion using 3 0 Vicryl, 1st reapproximating the vagina followed by the bulbocavernosus muscles and finally the skin  At the conclusion of the procedure, all needle, sponge, and instrument counts were noted to be correct  Patient tolerated the procedure well  Dr Yolanda Estevez was present and participated in all key portions of the case

## 2019-09-14 PROCEDURE — 99024 POSTOP FOLLOW-UP VISIT: CPT | Performed by: OBSTETRICS & GYNECOLOGY

## 2019-09-14 RX ORDER — IBUPROFEN 600 MG/1
600 TABLET ORAL EVERY 6 HOURS PRN
Qty: 30 TABLET | Refills: 0 | Status: SHIPPED | OUTPATIENT
Start: 2019-09-14 | End: 2019-09-15

## 2019-09-14 RX ORDER — ACETAMINOPHEN 325 MG/1
650 TABLET ORAL EVERY 6 HOURS PRN
Qty: 30 TABLET | Refills: 0 | Status: SHIPPED | OUTPATIENT
Start: 2019-09-14 | End: 2019-09-15

## 2019-09-14 RX ADMIN — IBUPROFEN 600 MG: 600 TABLET, FILM COATED ORAL at 04:55

## 2019-09-14 RX ADMIN — IBUPROFEN 600 MG: 600 TABLET, FILM COATED ORAL at 16:17

## 2019-09-14 RX ADMIN — DOCUSATE SODIUM 100 MG: 100 CAPSULE, LIQUID FILLED ORAL at 18:20

## 2019-09-14 RX ADMIN — PRENATAL VIT W/ FE FUMARATE-FA TAB 27-0.8 MG 1 TABLET: 27-0.8 TAB at 07:53

## 2019-09-14 RX ADMIN — DOCUSATE SODIUM 100 MG: 100 CAPSULE, LIQUID FILLED ORAL at 07:54

## 2019-09-14 RX ADMIN — FERROUS SULFATE TAB 325 MG (65 MG ELEMENTAL FE) 325 MG: 325 (65 FE) TAB at 07:54

## 2019-09-14 NOTE — PLAN OF CARE
Problem: PAIN - ADULT  Goal: Verbalizes/displays adequate comfort level or baseline comfort level  Description  Interventions:  - Encourage patient to monitor pain and request assistance  - Assess pain using appropriate pain scale  - Administer analgesics based on type and severity of pain and evaluate response  - Implement non-pharmacological measures as appropriate and evaluate response  - Consider cultural and social influences on pain and pain management  - Notify physician/advanced practitioner if interventions unsuccessful or patient reports new pain  Outcome: Progressing     Problem: INFECTION - ADULT  Goal: Absence or prevention of progression during hospitalization  Description  INTERVENTIONS:  - Assess and monitor for signs and symptoms of infection  - Monitor lab/diagnostic results  - Monitor all insertion sites, i e  indwelling lines, tubes, and drains  - Instruct and encourage patient and family to use good hand hygiene technique     Outcome: Progressing  Goal: Absence of fever/infection during neutropenic period  Description  INTERVENTIONS:  - Monitor WBC    Outcome: Progressing     Problem: SAFETY ADULT  Goal: Patient will remain free of falls  Description  INTERVENTIONS:  - Assess patient frequently for physical needs  -  Identify cognitive and physical deficits and behaviors that affect risk of falls    -  [place] fall precautions as indicated by assessment   - Educate patient/family on patient safety including physical limitations  - Instruct patient to call for assistance with activity based on assessment  - Modify environment to reduce risk of injury  - Consider OT/PT consult to assist with strengthening/mobility  Outcome: Progressing  Goal: Maintain or return to baseline ADL function  Description  INTERVENTIONS:  -  Assess patient's ability to carry out ADLs; assess patient's baseline for ADL function and identify physical deficits which impact ability to perform ADLs (bathing, care of mouth/teeth, toileting, grooming, dressing, etc )     Outcome: Progressing  Goal: Maintain or return mobility status to optimal level  Description  INTERVENTIONS:  - Assess patient's baseline mobility status (ambulation, transfers, stairs, etc )       Outcome: Progressing     Problem: DISCHARGE PLANNING  Goal: Discharge to home or other facility with appropriate resources  Description  INTERVENTIONS:  - Identify barriers to discharge w/patient and caregiver  - Arrange for needed discharge resources and transportation as appropriate  - Identify discharge learning needs (meds, wound care, etc )  - Arrange for interpretive services to assist at discharge as needed     Outcome: Progressing     Problem: POSTPARTUM  Goal: Experiences normal postpartum course  Description  INTERVENTIONS:  - Monitor maternal vital signs  - Assess uterine involution and lochia  Outcome: Progressing  Goal: Appropriate maternal -  bonding  Description  INTERVENTIONS:  - Identify family support  - Assess for appropriate maternal/infant bonding   -Encourage maternal/infant bonding opportunities  - Referral to  or  as needed  Outcome: Progressing  Goal: Establishment of infant feeding pattern  Description  INTERVENTIONS:  - Assess breast/bottle feeding  - Refer to lactation as needed  Outcome: Progressing  Goal: Incision(s), wounds(s) or drain site(s) healing without S/S of infection  Description  INTERVENTIONS  - Assess and document risk factors for skin impairment   - Assess and document dressing, incision, wound bed, drain sites and surrounding tissue  - Consider nutrition services referral as needed  - Oral mucous membranes remain intact  - Provide patient/ family education  Outcome: Progressing     Problem: Knowledge Deficit  Goal: Patient/family/caregiver demonstrates understanding of disease process, treatment plan, medications, and discharge instructions  Description  Complete learning assessment and assess knowledge base  Interventions:  - Provide teaching at level of understanding  - Provide teaching via preferred learning methods  Outcome: Completed  Goal: Verbalizes understanding of labor plan  Description  Assess patient/family/caregiver's baseline knowledge level and ability to understand information  Provide education via patient/family/caregiver's preferred learning method at appropriate level of understanding  1  Provide teaching at level of understanding  2  Provide teaching via preferred learning method(s)  Outcome: Completed     Problem: BIRTH - VAGINAL/ SECTION  Goal: Fetal and maternal status remain reassuring during the birth process  Description  INTERVENTIONS:  - Monitor vital signs  - Monitor fetal heart rate  - Monitor uterine activity  - Monitor labor progression (vaginal delivery)  - DVT prophylaxis     Outcome: Completed  Goal: Emotionally satisfying birthing experience for mother/fetus  Description  Interventions:  - Assess, plan, implement and evaluate the nursing care given to the patient in labor  - Advocate the philosophy that each childbirth experience is a unique experience and support the family's chosen level of involvement and control during the labor process   - Actively participate in both the patient's and family's teaching of the birth process  - Consider cultural, [employer] and age-specific factors and plan care for the patient in labor  Outcome: Completed     Problem: Labor & Delivery  Goal: Manages discomfort  Description  Assess and monitor for signs and symptoms of discomfort  Assess patient's pain level regularly and per hospital policy  Administer medications as ordered  Support use of nonpharmacological methods to help control pain such as distraction, imagery, relaxation, and application of heat and cold  Collaborate with interdisciplinary team and patient to determine appropriate pain management plan      1  Include patient in decisions related to comfort  2  Offer non-pharmacological pain management interventions  3  Report ineffective pain management to physician  Outcome: Completed  Goal: Patient vital signs are stable  Description  1  Assess vital signs - vaginal delivery    Outcome: Completed

## 2019-09-14 NOTE — PLAN OF CARE
Problem: PAIN - ADULT  Goal: Verbalizes/displays adequate comfort level or baseline comfort level  Description  Interventions:  - Encourage patient to monitor pain and request assistance  - Assess pain using appropriate pain scale  - Administer analgesics based on type and severity of pain and evaluate response  - Implement non-pharmacological measures as appropriate and evaluate response  - Consider cultural and social influences on pain and pain management  - Notify physician/advanced practitioner if interventions unsuccessful or patient reports new pain  Outcome: Progressing     Problem: INFECTION - ADULT  Goal: Absence or prevention of progression during hospitalization  Description  INTERVENTIONS:  - Assess and monitor for signs and symptoms of infection  - Monitor lab/diagnostic results  - Monitor all insertion sites, i e  indwelling lines, tubes, and drains  - Instruct and encourage patient and family to use good hand hygiene technique     Outcome: Progressing  Goal: Absence of fever/infection during neutropenic period  Description  INTERVENTIONS:  - Monitor WBC    Outcome: Progressing     Problem: SAFETY ADULT  Goal: Patient will remain free of falls  Description  INTERVENTIONS:  - Assess patient frequently for physical needs  -  Identify cognitive and physical deficits and behaviors that affect risk of falls    -  [place] fall precautions as indicated by assessment   - Educate patient/family on patient safety including physical limitations  - Instruct patient to call for assistance with activity based on assessment  - Modify environment to reduce risk of injury  - Consider OT/PT consult to assist with strengthening/mobility  Outcome: Progressing  Goal: Maintain or return to baseline ADL function  Description  INTERVENTIONS:  -  Assess patient's ability to carry out ADLs; assess patient's baseline for ADL function and identify physical deficits which impact ability to perform ADLs (bathing, care of mouth/teeth, toileting, grooming, dressing, etc )     Outcome: Progressing  Goal: Maintain or return mobility status to optimal level  Description  INTERVENTIONS:  - Assess patient's baseline mobility status (ambulation, transfers, stairs, etc )       Outcome: Progressing     Problem: DISCHARGE PLANNING  Goal: Discharge to home or other facility with appropriate resources  Description  INTERVENTIONS:  - Identify barriers to discharge w/patient and caregiver  - Arrange for needed discharge resources and transportation as appropriate  - Identify discharge learning needs (meds, wound care, etc )  - Arrange for interpretive services to assist at discharge as needed     Outcome: Progressing     Problem: POSTPARTUM  Goal: Experiences normal postpartum course  Description  INTERVENTIONS:  - Monitor maternal vital signs  - Assess uterine involution and lochia  Outcome: Progressing  Goal: Appropriate maternal -  bonding  Description  INTERVENTIONS:  - Identify family support  - Assess for appropriate maternal/infant bonding   -Encourage maternal/infant bonding opportunities  - Referral to  or  as needed  Outcome: Progressing  Goal: Establishment of infant feeding pattern  Description  INTERVENTIONS:  - Assess breast/bottle feeding  - Refer to lactation as needed  Outcome: Progressing  Goal: Incision(s), wounds(s) or drain site(s) healing without S/S of infection  Description  INTERVENTIONS  - Assess and document risk factors for skin impairment   - Assess and document dressing, incision, wound bed, drain sites and surrounding tissue  - Consider nutrition services referral as needed  - Oral mucous membranes remain intact  - Provide patient/ family education  Outcome: Progressing

## 2019-09-14 NOTE — LACTATION NOTE
This note was copied from a baby's chart  Mom set up to start pumping since infant is supplementing  Reviewed technique, frequency and equipment cleaning

## 2019-09-14 NOTE — PLAN OF CARE
Problem: PAIN - ADULT  Goal: Verbalizes/displays adequate comfort level or baseline comfort level  Description  Interventions:  - Encourage patient to monitor pain and request assistance  - Assess pain using appropriate pain scale  - Administer analgesics based on type and severity of pain and evaluate response  - Implement non-pharmacological measures as appropriate and evaluate response  - Consider cultural and social influences on pain and pain management  - Notify physician/advanced practitioner if interventions unsuccessful or patient reports new pain  9/14/2019 1303 by Cuca Saldivar RN  Outcome: Progressing  9/14/2019 1302 by Cuca Saldivar RN  Outcome: Progressing     Problem: INFECTION - ADULT  Goal: Absence or prevention of progression during hospitalization  Description  INTERVENTIONS:  - Assess and monitor for signs and symptoms of infection  - Monitor lab/diagnostic results  - Monitor all insertion sites, i e  indwelling lines, tubes, and drains  - Instruct and encourage patient and family to use good hand hygiene technique     9/14/2019 1303 by Cuca Saldivar RN  Outcome: Progressing  9/14/2019 1302 by Cuca Saldivar RN  Outcome: Progressing  Goal: Absence of fever/infection during neutropenic period  Description  INTERVENTIONS:  - Monitor WBC    9/14/2019 1303 by Cuca Saldivar RN  Outcome: Progressing  9/14/2019 1302 by Cuca Saldivar RN  Outcome: Progressing     Problem: SAFETY ADULT  Goal: Patient will remain free of falls  Description  INTERVENTIONS:  - Assess patient frequently for physical needs  -  Identify cognitive and physical deficits and behaviors that affect risk of falls    -  [place] fall precautions as indicated by assessment   - Educate patient/family on patient safety including physical limitations  - Instruct patient to call for assistance with activity based on assessment  - Modify environment to reduce risk of injury  - Consider OT/PT consult to assist with strengthening/mobility  2019 1303 by Quique Starr RN  Outcome: Progressing  2019 1302 by Quique Starr RN  Outcome: Progressing  Goal: Maintain or return to baseline ADL function  Description  INTERVENTIONS:  -  Assess patient's ability to carry out ADLs; assess patient's baseline for ADL function and identify physical deficits which impact ability to perform ADLs (bathing, care of mouth/teeth, toileting, grooming, dressing, etc )     2019 1303 by Quique Starr RN  Outcome: Progressing  2019 1302 by Quique Starr RN  Outcome: Progressing  Goal: Maintain or return mobility status to optimal level  Description  INTERVENTIONS:  - Assess patient's baseline mobility status (ambulation, transfers, stairs, etc )       2019 1303 by Quique Starr RN  Outcome: Progressing  2019 1302 by Quique Starr RN  Outcome: Progressing     Problem: DISCHARGE PLANNING  Goal: Discharge to home or other facility with appropriate resources  Description  INTERVENTIONS:  - Identify barriers to discharge w/patient and caregiver  - Arrange for needed discharge resources and transportation as appropriate  - Identify discharge learning needs (meds, wound care, etc )  - Arrange for interpretive services to assist at discharge as needed     2019 1303 by Quique Starr RN  Outcome: Progressing  2019 1302 by Quique Starr RN  Outcome: Progressing     Problem: POSTPARTUM  Goal: Experiences normal postpartum course  Description  INTERVENTIONS:  - Monitor maternal vital signs  - Assess uterine involution and lochia  2019 1303 by Quique Starr RN  Outcome: Progressing  2019 1302 by Quique Starr RN  Outcome: Progressing  Goal: Appropriate maternal -  bonding  Description  INTERVENTIONS:  - Identify family support  - Assess for appropriate maternal/infant bonding   -Encourage maternal/infant bonding opportunities  - Referral to  or  as needed  2019 1303 by Gomez Bihari, RN  Outcome: Progressing  9/14/2019 1302 by Gomez Templeton RN  Outcome: Progressing  Goal: Establishment of infant feeding pattern  Description  INTERVENTIONS:  - Assess breast/bottle feeding  - Refer to lactation as needed  9/14/2019 1303 by Gomez Templeton RN  Outcome: Progressing  9/14/2019 1302 by Gomez Templeton RN  Outcome: Progressing  Goal: Incision(s), wounds(s) or drain site(s) healing without S/S of infection  Description  INTERVENTIONS  - Assess and document risk factors for skin impairment   - Assess and document dressing, incision, wound bed, drain sites and surrounding tissue  - Consider nutrition services referral as needed  - Oral mucous membranes remain intact  - Provide patient/ family education  9/14/2019 1303 by Gomez Templeton RN  Outcome: Progressing  9/14/2019 1302 by Gomez Templeton RN  Outcome: Progressing

## 2019-09-14 NOTE — PROGRESS NOTES
Progress Note - OB/GYN   Audrey Ravi 36 y o  female MRN: [id number]  Unit/Bed#: -01 Encounter: [id number]    Assessment:  36 y o  Deloria Lefort s/p Spontaneous Vaginal Delivery Postpartum day  1    Plan:  1  Routine post-partum care  2  Encourage ambulation  3  Pain control as needed  4  Advance diet as tolerated  5  Rhogam not indicated  6  PreE w/o SF; P/C ratio 0 32   -  - 140s / 60 - 70s  7  Anticipate discharge 9/15/19    Subjective/Objective   Chief Complaint:       Subjective:  Audrey Ravi is well appearing and has no complaints at this time  She denies any dizziness, nausea, vomiting, chest pain, shortness of breath, palpitations, or headaches  Pain: Well controlled with pain medication regimen  Tolerating PO: yes  Voiding: yes  Flatus: yes  BM: no  Ambulating: yes  Breastfeeding: yes  Chest pain: no  Shortness of breath: no  Leg pain: no  Lochia: Decreasing    Objective:     Vitals: Blood pressure 126/65, pulse 89, temperature 98 3 °F (36 8 °C), temperature source Oral, resp  rate 16, height 5' 10" (1 778 m), weight 95 3 kg (210 lb), last menstrual period 12/13/2018, SpO2 98 %, currently breastfeeding    No intake or output data in the 24 hours ending 09/14/19 0840    Physical Exam:     General: NAD  Cardiovascular: RRR, no murmur, nl S1/S2   Lungs: CTAB, non-labored breathing   Abdomen: Soft, no distension/rebound/guarding/tenderness   Fundus: Firm, non-tender, fundus: -2 cm below the umbilicus   Lower Extremities: Non-tender      Lab, Imaging and other studies:     Recent Results (from the past 72 hour(s))   Type and screen    Collection Time: 09/12/19 12:01 PM   Result Value Ref Range    ABO Grouping A     Rh Factor Positive     Antibody Screen Negative     Specimen Expiration Date [id number]    CBC and differential    Collection Time: 09/12/19 12:01 PM   Result Value Ref Range    WBC 9 58 4 31 - 10 16 Thousand/uL    RBC 3 94 3 81 - 5 12 Million/uL    Hemoglobin 11 4 (L) 11 5 - 15 4 g/dL Hematocrit 35 6 34 8 - 46 1 %    MCV 90 82 - 98 fL    MCH 28 9 26 8 - 34 3 pg    MCHC 32 0 31 4 - 37 4 g/dL    RDW 14 2 11 6 - 15 1 %    MPV 11 4 8 9 - 12 7 fL    Platelets 271 411 - 037 Thousands/uL    nRBC 0 /100 WBCs    Neutrophils Relative 79 (H) 43 - 75 %    Immat GRANS % 1 0 - 2 %    Lymphocytes Relative 13 (L) 14 - 44 %    Monocytes Relative 6 4 - 12 %    Eosinophils Relative 1 0 - 6 %    Basophils Relative 0 0 - 1 %    Neutrophils Absolute 7 60 1 85 - 7 62 Thousands/µL    Immature Grans Absolute 0 07 0 00 - 0 20 Thousand/uL    Lymphocytes Absolute 1 27 0 60 - 4 47 Thousands/µL    Monocytes Absolute 0 56 0 17 - 1 22 Thousand/µL    Eosinophils Absolute 0 05 0 00 - 0 61 Thousand/µL    Basophils Absolute 0 03 0 00 - 0 10 Thousands/µL   RPR    Collection Time: 09/12/19 12:01 PM   Result Value Ref Range    RPR Non-Reactive Non-Reactive   Comprehensive metabolic panel    Collection Time: 09/12/19 12:01 PM   Result Value Ref Range    Sodium 138 136 - 145 mmol/L    Potassium 3 6 3 5 - 5 3 mmol/L    Chloride 108 100 - 108 mmol/L    CO2 22 21 - 32 mmol/L    ANION GAP 8 4 - 13 mmol/L    BUN 7 5 - 25 mg/dL    Creatinine 0 53 (L) 0 60 - 1 30 mg/dL    Glucose 82 65 - 140 mg/dL    Calcium 9 4 8 3 - 10 1 mg/dL    AST 13 5 - 45 U/L    ALT 16 12 - 78 U/L    Alkaline Phosphatase 86 46 - 116 U/L    Total Protein 7 1 6 4 - 8 2 g/dL    Albumin 2 8 (L) 3 5 - 5 0 g/dL    Total Bilirubin 0 17 (L) 0 20 - 1 00 mg/dL    eGFR 125 ml/min/1 73sq m   UA (URINE) with reflex to Microscopic    Collection Time: 09/12/19 12:01 PM   Result Value Ref Range    Color, UA Yellow     Clarity, UA Cloudy     Specific [place], UA 1 007 1 003 - 1 030    pH, UA 7 5 4 5, 5 0, 5 5, 6 0, 6 5, 7 0, 7 5, 8 0    Leukocytes, UA Moderate (A) Negative    Nitrite, UA Negative Negative    Protein, UA Negative Negative mg/dl    Glucose, UA Negative Negative mg/dl    Ketones, UA Negative Negative mg/dl    Urobilinogen, UA 0 2 0 2, 1 0 E U /dl E U /dl    Bilirubin, UA Negative Negative    Blood, UA Large (A) Negative   Protein / creatinine ratio, urine    Collection Time: 09/12/19 12:01 PM   Result Value Ref Range    Creatinine, Ur 68 0 mg/dL    Protein Urine Random 22 mg/dL    Prot/Creat Ratio, Ur 0 32 (H) 0 00 - 0 10   Urine Microscopic    Collection Time: 09/12/19 12:01 PM   Result Value Ref Range    RBC, UA 2-4 (A) None Seen, 0-5 /hpf    WBC, UA 4-10 (A) None Seen, 0-5, 5-55, 5-65 /hpf    Epithelial Cells Moderate (A) None Seen, Occasional /hpf    Bacteria, UA Occasional None Seen, Occasional /hpf   Blood gas, arterial, cord    Collection Time: 09/13/19  4:34 AM   Result Value Ref Range    pH, Cord Art 7 168 (L) 7 230 - 7 430    pCO2, Cord Art 60 7 (H) 30 0 - 60 0    pO2, Cord Art 18 6 5 0 - 25 0 mm HG    HCO3, Cord Art 21 5 17 3 - 27 3 mmol/L    Base Exc, Cord Art -8 1 (L) 3 0 - 11 0 mmol/L    O2 Content, Cord Art 6 8 ml/dl    O2 Hgb, Arterial Cord 29 8 %   Blood gas, venous, cord    Collection Time: 09/13/19  4:34 AM   Result Value Ref Range    pH, Cord Hector 7 243 7 190 - 7 490    pCO2, Cord Hcetor 51 6 (H) 27 0 - 43 0 mm HG    pO2, Cord Hector 16 3 15 0 - 45 0 mm HG    HCO3, Cord Hector 21 8 12 2 - 28 6 mmol/L    Base Exc, Cord [place] -6 1 (L) 1 0 - 9 0 mmol/L    O2 Cont, Cord Hector 6 3 mL/dL    O2 HGB,VENOUS CORD 27 9 %     Meds:    docusate sodium 100 mg Oral BID   ferrous sulfate 325 mg Oral Daily With Breakfast   prenatal multivitamin 1 tablet Oral Daily       acetaminophen 650 mg Q6H PRN   benzocaine-menthol-lanolin-aloe  4x Daily PRN   calcium carbonate 1,000 mg Daily PRN   diphenhydrAMINE 25 mg Q6H PRN   hydrocortisone 1 application PRN   ibuprofen 600 mg Q6H PRN   ondansetron 4 mg Q8H PRN   oxyCODONE 5 mg Q4H PRN   simethicone 80 mg 4x Daily PRN   witch hazel-glycerin 1 pad PRN             Signature / Title: Matthew Arnold MD, Ob/Gyn, PGY-1  Date: 9/14/2019  Time: 8:40 AM

## 2019-09-15 VITALS
WEIGHT: 210 LBS | SYSTOLIC BLOOD PRESSURE: 141 MMHG | HEART RATE: 80 BPM | RESPIRATION RATE: 20 BRPM | DIASTOLIC BLOOD PRESSURE: 91 MMHG | BODY MASS INDEX: 30.06 KG/M2 | HEIGHT: 70 IN | OXYGEN SATURATION: 97 % | TEMPERATURE: 98.1 F

## 2019-09-15 PROCEDURE — 99024 POSTOP FOLLOW-UP VISIT: CPT | Performed by: OBSTETRICS & GYNECOLOGY

## 2019-09-15 RX ORDER — ACETAMINOPHEN 325 MG/1
650 TABLET ORAL EVERY 6 HOURS PRN
Qty: 30 TABLET | Refills: 0
Start: 2019-09-15 | End: 2019-10-25 | Stop reason: ALTCHOICE

## 2019-09-15 RX ORDER — IBUPROFEN 200 MG
600 TABLET ORAL EVERY 6 HOURS PRN
Start: 2019-09-15

## 2019-09-15 RX ADMIN — IBUPROFEN 600 MG: 600 TABLET, FILM COATED ORAL at 08:01

## 2019-09-15 RX ADMIN — PRENATAL VIT W/ FE FUMARATE-FA TAB 27-0.8 MG 1 TABLET: 27-0.8 TAB at 08:00

## 2019-09-15 RX ADMIN — IBUPROFEN 600 MG: 600 TABLET, FILM COATED ORAL at 15:54

## 2019-09-15 RX ADMIN — DOCUSATE SODIUM 100 MG: 100 CAPSULE, LIQUID FILLED ORAL at 08:01

## 2019-09-15 RX ADMIN — FERROUS SULFATE TAB 325 MG (65 MG ELEMENTAL FE) 325 MG: 325 (65 FE) TAB at 07:52

## 2019-09-15 RX ADMIN — IBUPROFEN 600 MG: 600 TABLET, FILM COATED ORAL at 02:45

## 2019-09-15 NOTE — LACTATION NOTE
This note was copied from a baby's chart  Mom states infant is feeding well at breast without SNS  Observed infant at breast in cradle hold  Minor re-positioning suggested for a closer latch  Reviewed expected changes in infant feeding patterns over the next few days, engorgement relief measures, signs of milk transfer, use of feeding log and when and where to call for additional assistance as needed  Given discharge breastfeeding pkat and same reviewed  Mom is pumping 20 ml of breast milk  Discussed stopping supplement as per pediatrician to see how infant does

## 2019-09-15 NOTE — PLAN OF CARE
Problem: PAIN - ADULT  Goal: Verbalizes/displays adequate comfort level or baseline comfort level  Description  Interventions:  - Encourage patient to monitor pain and request assistance  - Assess pain using appropriate pain scale  - Administer analgesics based on type and severity of pain and evaluate response  - Implement non-pharmacological measures as appropriate and evaluate response  - Consider cultural and social influences on pain and pain management  - Notify physician/advanced practitioner if interventions unsuccessful or patient reports new pain  Outcome: Progressing     Problem: INFECTION - ADULT  Goal: Absence or prevention of progression during hospitalization  Description  INTERVENTIONS:  - Assess and monitor for signs and symptoms of infection  - Monitor lab/diagnostic results  - Monitor all insertion sites, i e  indwelling lines, tubes, and drains  - Instruct and encourage patient and family to use good hand hygiene technique     Outcome: Progressing  Goal: Absence of fever/infection during neutropenic period  Description  INTERVENTIONS:  - Monitor WBC    Outcome: Progressing     Problem: SAFETY ADULT  Goal: Patient will remain free of falls  Description  INTERVENTIONS:  - Assess patient frequently for physical needs  -  Identify cognitive and physical deficits and behaviors that affect risk of falls    -  [place] fall precautions as indicated by assessment   - Educate patient/family on patient safety including physical limitations  - Instruct patient to call for assistance with activity based on assessment  - Modify environment to reduce risk of injury  - Consider OT/PT consult to assist with strengthening/mobility  Outcome: Progressing  Goal: Maintain or return to baseline ADL function  Description  INTERVENTIONS:  -  Assess patient's ability to carry out ADLs; assess patient's baseline for ADL function and identify physical deficits which impact ability to perform ADLs (bathing, care of mouth/teeth, toileting, grooming, dressing, etc )     Outcome: Progressing  Goal: Maintain or return mobility status to optimal level  Description  INTERVENTIONS:  - Assess patient's baseline mobility status (ambulation, transfers, stairs, etc )       Outcome: Progressing     Problem: DISCHARGE PLANNING  Goal: Discharge to home or other facility with appropriate resources  Description  INTERVENTIONS:  - Identify barriers to discharge w/patient and caregiver  - Arrange for needed discharge resources and transportation as appropriate  - Identify discharge learning needs (meds, wound care, etc )  - Arrange for interpretive services to assist at discharge as needed     Outcome: Progressing     Problem: POSTPARTUM  Goal: Experiences normal postpartum course  Description  INTERVENTIONS:  - Monitor maternal vital signs  - Assess uterine involution and lochia  Outcome: Progressing  Goal: Appropriate maternal -  bonding  Description  INTERVENTIONS:  - Identify family support  - Assess for appropriate maternal/infant bonding   -Encourage maternal/infant bonding opportunities  - Referral to  or  as needed  Outcome: Progressing  Goal: Establishment of infant feeding pattern  Description  INTERVENTIONS:  - Assess breast/bottle feeding  - Refer to lactation as needed  Outcome: Progressing  Goal: Incision(s), wounds(s) or drain site(s) healing without S/S of infection  Description  INTERVENTIONS  - Assess and document risk factors for skin impairment   - Assess and document dressing, incision, wound bed, drain sites and surrounding tissue  - Consider nutrition services referral as needed  - Oral mucous membranes remain intact  - Provide patient/ family education  Outcome: Progressing

## 2019-09-15 NOTE — PROGRESS NOTES
Progress Note - OB/GYN   Terrance Chi 36 y o  female MRN: [id number]  Unit/Bed#: -01 Encounter: [id number]    Assessment:  36 y o  Crystal Garcia s/p Spontaneous Vaginal Delivery Postpartum day  2    Plan:  1  Routine post-partum care  2  Encourage ambulation  3  Pain control as needed  4  Advance diet as tolerated  5  Rhogam not indicated  6  PreE w/o SF   - BP 120s - 140s / 70s -80s  6  Anticipate discharge 9/15/19    Subjective/Objective   Chief Complaint:       Subjective:  Terrance Chi is well appearing and has no complaints at this time  She denies any dizziness, nausea, vomiting, chest pain, shortness of breath, palpitations, or headaches  She feels ready to be discharged home today  Pain: Well controlled with pain medication regimen  Tolerating PO: yes  Voiding: yes  Flatus: yes  BM: no  Ambulating: yes  Breastfeeding: yes  Chest pain: no  Shortness of breath: no  Leg pain: no  Lochia: Decreasing    Objective:     Vitals: Blood pressure 126/77, pulse 74, temperature 97 7 °F (36 5 °C), temperature source Oral, resp  rate 18, height 5' 10" (1 778 m), weight 95 3 kg (210 lb), last menstrual period 12/13/2018, SpO2 97 %, currently breastfeeding    No intake or output data in the 24 hours ending 09/15/19 0734    Physical Exam:     General: NAD  Cardiovascular: RRR, no murmur, nl S1/S2   Lungs: CTAB, non-labored breathing   Abdomen: Soft, no distension/rebound/guarding/tenderness   Fundus: Firm, non-tender, fundus: -2 cm below the umbilicus   Lower Extremities: Non-tender      Lab, Imaging and other studies:     Recent Results (from the past 72 hour(s))   Type and screen    Collection Time: 09/12/19 12:01 PM   Result Value Ref Range    ABO Grouping A     Rh Factor Positive     Antibody Screen Negative     Specimen Expiration Date [id number]    CBC and differential    Collection Time: 09/12/19 12:01 PM   Result Value Ref Range    WBC 9 58 4 31 - 10 16 Thousand/uL    RBC 3 94 3 81 - 5 12 Million/uL    Hemoglobin 11 4 (L) 11 5 - 15 4 g/dL    Hematocrit 35 6 34 8 - 46 1 %    MCV 90 82 - 98 fL    MCH 28 9 26 8 - 34 3 pg    MCHC 32 0 31 4 - 37 4 g/dL    RDW 14 2 11 6 - 15 1 %    MPV 11 4 8 9 - 12 7 fL    Platelets 146 062 - 587 Thousands/uL    nRBC 0 /100 WBCs    Neutrophils Relative 79 (H) 43 - 75 %    Immat GRANS % 1 0 - 2 %    Lymphocytes Relative 13 (L) 14 - 44 %    Monocytes Relative 6 4 - 12 %    Eosinophils Relative 1 0 - 6 %    Basophils Relative 0 0 - 1 %    Neutrophils Absolute 7 60 1 85 - 7 62 Thousands/µL    Immature Grans Absolute 0 07 0 00 - 0 20 Thousand/uL    Lymphocytes Absolute 1 27 0 60 - 4 47 Thousands/µL    Monocytes Absolute 0 56 0 17 - 1 22 Thousand/µL    Eosinophils Absolute 0 05 0 00 - 0 61 Thousand/µL    Basophils Absolute 0 03 0 00 - 0 10 Thousands/µL   RPR    Collection Time: 09/12/19 12:01 PM   Result Value Ref Range    RPR Non-Reactive Non-Reactive   Comprehensive metabolic panel    Collection Time: 09/12/19 12:01 PM   Result Value Ref Range    Sodium 138 136 - 145 mmol/L    Potassium 3 6 3 5 - 5 3 mmol/L    Chloride 108 100 - 108 mmol/L    CO2 22 21 - 32 mmol/L    ANION GAP 8 4 - 13 mmol/L    BUN 7 5 - 25 mg/dL    Creatinine 0 53 (L) 0 60 - 1 30 mg/dL    Glucose 82 65 - 140 mg/dL    Calcium 9 4 8 3 - 10 1 mg/dL    AST 13 5 - 45 U/L    ALT 16 12 - 78 U/L    Alkaline Phosphatase 86 46 - 116 U/L    Total Protein 7 1 6 4 - 8 2 g/dL    Albumin 2 8 (L) 3 5 - 5 0 g/dL    Total Bilirubin 0 17 (L) 0 20 - 1 00 mg/dL    eGFR 125 ml/min/1 73sq m   UA (URINE) with reflex to Microscopic    Collection Time: 09/12/19 12:01 PM   Result Value Ref Range    Color, UA Yellow     Clarity, UA Cloudy     Specific [place], UA 1 007 1 003 - 1 030    pH, UA 7 5 4 5, 5 0, 5 5, 6 0, 6 5, 7 0, 7 5, 8 0    Leukocytes, UA Moderate (A) Negative    Nitrite, UA Negative Negative    Protein, UA Negative Negative mg/dl    Glucose, UA Negative Negative mg/dl    Ketones, UA Negative Negative mg/dl    Urobilinogen, UA 0 2 0 2, 1 0 E U /dl E U /dl    Bilirubin, UA Negative Negative    Blood, UA Large (A) Negative   Protein / creatinine ratio, urine    Collection Time: 09/12/19 12:01 PM   Result Value Ref Range    Creatinine, Ur 68 0 mg/dL    Protein Urine Random 22 mg/dL    Prot/Creat Ratio, Ur 0 32 (H) 0 00 - 0 10   Urine Microscopic    Collection Time: 09/12/19 12:01 PM   Result Value Ref Range    RBC, UA 2-4 (A) None Seen, 0-5 /hpf    WBC, UA 4-10 (A) None Seen, 0-5, 5-55, 5-65 /hpf    Epithelial Cells Moderate (A) None Seen, Occasional /hpf    Bacteria, UA Occasional None Seen, Occasional /hpf   Blood gas, arterial, cord    Collection Time: 09/13/19  4:34 AM   Result Value Ref Range    pH, Cord Art 7 168 (L) 7 230 - 7 430    pCO2, Cord Art 60 7 (H) 30 0 - 60 0    pO2, Cord Art 18 6 5 0 - 25 0 mm HG    HCO3, Cord Art 21 5 17 3 - 27 3 mmol/L    Base Exc, Cord Art -8 1 (L) 3 0 - 11 0 mmol/L    O2 Content, Cord Art 6 8 ml/dl    O2 Hgb, Arterial Cord 29 8 %   Blood gas, venous, cord    Collection Time: 09/13/19  4:34 AM   Result Value Ref Range    pH, Cord Hector 7 243 7 190 - 7 490    pCO2, Cord Hector 51 6 (H) 27 0 - 43 0 mm HG    pO2, Cord Hector 16 3 15 0 - 45 0 mm HG    HCO3, Cord Hector 21 8 12 2 - 28 6 mmol/L    Base Exc, Cord Hector -6 1 (L) 1 0 - 9 0 mmol/L    O2 Cont, Cord Hector 6 3 mL/dL    O2 HGB,VENOUS CORD 27 9 %     Meds:    docusate sodium 100 mg Oral BID   ferrous sulfate 325 mg Oral Daily With Breakfast   prenatal multivitamin 1 tablet Oral Daily       acetaminophen 650 mg Q6H PRN   benzocaine-menthol-lanolin-aloe  4x Daily PRN   calcium carbonate 1,000 mg Daily PRN   diphenhydrAMINE 25 mg Q6H PRN   hydrocortisone 1 application PRN   ibuprofen 600 mg Q6H PRN   ondansetron 4 mg Q8H PRN   oxyCODONE 5 mg Q4H PRN   simethicone 80 mg 4x Daily PRN   witch hazel-glycerin 1 pad PRN             Signature / Title: Milana Ca MD, Ob/Gyn, PGY-1  Date: 9/15/2019  Time: 7:34 AM

## 2019-09-18 NOTE — UTILIZATION REVIEW
Notification of Maternity Inpatient Admission/Maternity Inpatient Authorization Request - portal would not allow auth to go through so I dont have a pending auth # for this case    This is a Notification of Maternity Inpatient Admission/Maternity Inpatient Authorization Request to our facility [address] [place] [address]  Please be advised that this patient is currently in our facility under Inpatient Status  Below you will find the Birth/[place] Summary, Attending Physician and Facilitys information including NPI#  and contact information for the Utilization Review Department where the patient is receiving care services  Facility: [address] [place] [address]  Address: [place] Buckner, [facility]  Phone: [phone] Tax ID: [id number]  NPI: [id number]  MEDICARE ID: [id number]    Place of Service Code: 24   Place of Service Name: Inpatient Hospital  Presentation Date & Time: 2019 10:46 AM  Inpatient Admission Date & Time: 19 1046  Discharge Date & Time: 9/15/2019  5:55 PM   Discharge Disposition (if discharged): Home/Self Care  Attending Physician & NPI: Angelita Dixon, Lata Lehman [[id number]]  Attending Physician:  WILMA Leach    Specialty- Obstetrics and Gynecology  [facility] ID- [id number]  [address] [place] [address], [address] [place] [address]  Phone 1: [phone]  Fax: [phone]    Mother of [place] Information: Dae Marie   MRN: [id number] YOB: 1985   Mother's Admitting Diagnosis: Encounter for full-term uncomplicated delivery [D88]  Estimated Date of Delivery: 19  Type of Delivery: Vaginal, Spontaneous    Delivering clinician: Angelita Dixon   OB History        1    Para   1    Term   1            AB        Living   1       SAB        TAB        Ectopic        Multiple   0    Live Births   1                Name & MRN:   Information for the patient's :  Armand Tirado [[id number]] [place] Delivery Information:  Sex: female  Delivered 2019 4:30 AM by Vaginal, Spontaneous; Gestational Age: 36w3d     Measurements:  Weight: 8 lb 10 oz (3912 g); Height: 20"    APGAR 1 minute 5 minutes 10 minutes   Totals: 8 9      Thank you,  [address]  Utilization   [facility] Utilization Review Department  Phone: [phone] Fax [phone]  ATTENTION: Please call with any questions or concerns to [phone]  and carefully follow the prompts so that you are directed to the right person  Send all requests for admission clinical reviews, approved or denied determinations and any other requests to fax [phone]   All voicemails are confidential

## 2019-09-19 ENCOUNTER — CLINICAL SUPPORT (OUTPATIENT)
Dept: OBGYN CLINIC | Facility: CLINIC | Age: 34
End: 2019-09-19
Payer: COMMERCIAL

## 2019-09-19 VITALS
HEART RATE: 88 BPM | RESPIRATION RATE: 16 BRPM | DIASTOLIC BLOOD PRESSURE: 90 MMHG | WEIGHT: 186.6 LBS | SYSTOLIC BLOOD PRESSURE: 140 MMHG | BODY MASS INDEX: 26.77 KG/M2

## 2019-09-19 DIAGNOSIS — R03.0 ELEVATED BLOOD PRESSURE READING: Primary | ICD-10-CM

## 2019-09-19 PROCEDURE — 99211 OFF/OP EST MAY X REQ PHY/QHP: CPT

## 2019-09-19 NOTE — PROGRESS NOTES
Patient here for blood pressure check  Denies any headaches, blurred vision, epigastric pain, edema, chest pain, SOB or palpations  /90  Please call the office for headaches,edema,  visual changes, SOB, chest pain, epigastric pain or palpations  Verbalized understanding

## 2019-09-23 LAB — PLACENTA IN STORAGE: NORMAL

## 2019-10-04 ENCOUNTER — TELEPHONE (OUTPATIENT)
Dept: OBGYN CLINIC | Facility: CLINIC | Age: 34
End: 2019-10-04

## 2019-10-04 NOTE — TELEPHONE ENCOUNTER
----- Message from Bari Slade RN sent at 2019 12:43 PM EDT -----  Girl    ----- Message -----  From: Indy Shaikh MD  Sent: 2019   5:07 AM EDT  To: Ja Dave, #     - 1 week bp check

## 2019-10-04 NOTE — TELEPHONE ENCOUNTER
How are you feeling?good  Are you having any vaginal bleeding?yes less flow than a normal period  Have you had any problems voiding?no  Have you had any problems moving your bowels?no  Type of Delivery?   Vaginal delivery - any issues with healing? No   - is your incision healing well? NA  Is there any redness or drainage? NA   Denies any headaches, blurred vision, epigastric pain, edema, chest pain, SOB or palpations  I see you had a baby : female  How is the baby doing? great  Are you breast/bottle feeding? Breast  How is that going? good  Have you seen lactation consultant? yes - in hospital    Are you having any baby blues?no  Do you have any help at home?yes  EPDS - 5    Follow up appointment scheduled - 10/25/19  Patient advised to call the office for abnormal bleeding, mastitis, infection, any signs of postpartum depression  Patient verbalized understanding  Routing to provider to update

## 2019-10-10 ENCOUNTER — TELEPHONE (OUTPATIENT)
Dept: OBGYN CLINIC | Facility: CLINIC | Age: 34
End: 2019-10-10

## 2019-10-10 DIAGNOSIS — N61.0 MASTITIS: Primary | ICD-10-CM

## 2019-10-10 NOTE — TELEPHONE ENCOUNTER
Approx 4 wks PP - breastfeeding - C/o flu like symptoms, sweating, achy, headache, nausea, pain in left breast, low grade fever 100 9  Continue to breast feed/pump, massage reddened area, warm shower, tylenol and motrin  Advised we will send antibiotic to pharmacy  Routing to provider to order

## 2019-10-10 NOTE — TELEPHONE ENCOUNTER
Advised Dicloxacillin has been sent to pharmacy - one tablet 4 x a day for 7 days - should feel better in 24 hours  Verbalized understanding

## 2019-10-25 ENCOUNTER — POSTPARTUM VISIT (OUTPATIENT)
Dept: OBGYN CLINIC | Facility: CLINIC | Age: 34
End: 2019-10-25

## 2019-10-25 VITALS — WEIGHT: 180.6 LBS | DIASTOLIC BLOOD PRESSURE: 72 MMHG | SYSTOLIC BLOOD PRESSURE: 112 MMHG | BODY MASS INDEX: 25.91 KG/M2

## 2019-10-25 PROCEDURE — 99024 POSTOP FOLLOW-UP VISIT: CPT | Performed by: OBSTETRICS & GYNECOLOGY

## 2019-10-26 PROBLEM — Z3A.39 39 WEEKS GESTATION OF PREGNANCY: Status: RESOLVED | Noted: 2019-05-28 | Resolved: 2019-10-26

## 2019-10-26 PROBLEM — R12 HEART BURN: Status: RESOLVED | Noted: 2019-04-30 | Resolved: 2019-10-26

## 2019-10-26 PROBLEM — O14.00 PRE-ECLAMPSIA, MILD: Status: RESOLVED | Noted: 2019-09-12 | Resolved: 2019-10-26

## 2019-10-26 PROBLEM — Z34.03 ENCOUNTER FOR SUPERVISION OF NORMAL FIRST PREGNANCY IN THIRD TRIMESTER: Status: RESOLVED | Noted: 2019-07-15 | Resolved: 2019-10-26

## 2019-10-26 NOTE — PROGRESS NOTES
Assessment/Plan     Normal postpartum exam      1  Contraception: will return for [facility]  2  Annual exam due in January  3  Lactation consult, [address] information discussed  4  Increase activity as tolerated, may resume all normal activity  5  Anticipated return to work: 6 - 12 weeks post partum  Dalia yAers is a 36 y o  female who presents for a postpartum visit  She is 6 weeks postpartum following a spontaneous vaginal delivery  I have fully reviewed the prenatal and intrapartum course  The delivery was at 44 gestational weeks  Anesthesia: epidural  Laceration: 2nd degree  Bleeding no bleeding  Bowel function is normal  Bladder function is normal  Patient has not been sexually active  Desired contraception method is IUD  Postpartum depression screening: negative  EPDS : 6    Baby's course has been uneventful  Baby is feeding by breast     Last Pap : 2019 ; no abnormalities  Gestational Diabetes: no  Pregnancy Complications: pre-clampsia    The following portions of the patient's history were reviewed and updated as appropriate: allergies, current medications, past family history, past medical history, past social history, past surgical history and problem list       Current Outpatient Medications:     calcium carbonate (TUMS) 500 mg chewable tablet, Chew 1 tablet daily, Disp: , Rfl:     ibuprofen (MOTRIN) 200 mg tablet, Take 3 tablets (600 mg total) by mouth every 6 (six) hours as needed for moderate pain (cramping), Disp: , Rfl:     Prenatal MV-Min-Fe Fum-FA-DHA (PRENATAL+DHA) 28-0 975 & 200 MG MISC, Take 1 tablet by mouth daily, Disp: , Rfl:     Allergies   Allergen Reactions    Lactose Diarrhea     Very mild, Takes lactaid         Review of Systems  Constitutional: no fever, feels well  Breasts: no complaints of breast pain, breast lump, or nipple discharge  Gastrointestinal: no complaints nausea, vomiting  Genitourinary: as noted in HPI    Neurological: no complaints of headache      Objective      /72 (BP Location: Right arm, Patient Position: Sitting, Cuff Size: Standard)   Wt 81 9 kg (180 lb 9 6 oz)   LMP 12/13/2018 (Exact Date)   Breastfeeding?  Yes   BMI 25 91 kg/m²     OBGyn Exam  General: alert and oriented, in no acute distress  Abdomen: soft, non-tender, without masses or organomegaly

## 2019-11-25 ENCOUNTER — PROCEDURE VISIT (OUTPATIENT)
Dept: OBGYN CLINIC | Facility: CLINIC | Age: 34
End: 2019-11-25
Payer: COMMERCIAL

## 2019-11-25 VITALS — BODY MASS INDEX: 25.77 KG/M2 | SYSTOLIC BLOOD PRESSURE: 110 MMHG | WEIGHT: 179.6 LBS | DIASTOLIC BLOOD PRESSURE: 70 MMHG

## 2019-11-25 DIAGNOSIS — Z30.430 ENCOUNTER FOR IUD INSERTION: Primary | ICD-10-CM

## 2019-11-25 LAB — SL AMB POCT URINE HCG: NEGATIVE

## 2019-11-25 PROCEDURE — 58300 INSERT INTRAUTERINE DEVICE: CPT | Performed by: OBSTETRICS & GYNECOLOGY

## 2019-11-25 PROCEDURE — 81025 URINE PREGNANCY TEST: CPT | Performed by: OBSTETRICS & GYNECOLOGY

## 2019-11-25 NOTE — PROGRESS NOTES
Iud insertions  Date/Time: 11/25/2019 4:15 PM  Performed by: Maren Bright MD  Authorized by: Maren Bright MD     Consent:     Consent obtained:  Written    Consent given by:  Patient    Procedure risks and benefits discussed: yes      Patient questions answered: yes      Patient agrees, verbalizes understanding, and wants to proceed: yes      Educational handouts given: yes      Instructions and paperwork completed: yes    Procedure:     Negative urine pregnancy test: yes      Cervix cleaned and prepped: yes      Speculum placed in vagina: yes      Tenaculum applied to cervix: yes      Uterus sounded: yes      Uterus sound depth (cm):  8    IUD type: Flavia Cowman      Strings trimmed: yes (4cm)    Post-procedure:     Patient tolerated procedure well: yes      Patient will follow up after next period: yes

## 2020-09-02 ENCOUNTER — TELEPHONE (OUTPATIENT)
Dept: OBGYN CLINIC | Facility: CLINIC | Age: 35
End: 2020-09-02

## 2020-09-02 DIAGNOSIS — N61.0 MASTITIS: Primary | ICD-10-CM

## 2020-09-02 NOTE — TELEPHONE ENCOUNTER
Patient called and discussed weaning and pumping during her treatment course for mastitis  Rx sent for treatment  Recommended to hold on further weaning and to consider adding in a pumping session between morning and evening nursing to help milk flow and decrease risk of abscess  Patient expressed understanding and appreciation for call

## 2020-09-02 NOTE — TELEPHONE ENCOUNTER
Pt is having breast pain, redness and a fever  She feels like she has mastitis  Pt would also like to speak with you regarding weaning, the possible infection and currently pumping  Thank you  She can be reached at [phone]

## 2021-04-09 DIAGNOSIS — Z23 ENCOUNTER FOR IMMUNIZATION: ICD-10-CM

## 2022-08-15 ENCOUNTER — OFFICE VISIT (OUTPATIENT)
Dept: OBGYN CLINIC | Facility: CLINIC | Age: 37
End: 2022-08-15
Payer: COMMERCIAL

## 2022-08-15 VITALS
HEIGHT: 70 IN | WEIGHT: 192 LBS | SYSTOLIC BLOOD PRESSURE: 110 MMHG | BODY MASS INDEX: 27.49 KG/M2 | DIASTOLIC BLOOD PRESSURE: 62 MMHG

## 2022-08-15 DIAGNOSIS — Z30.432 ENCOUNTER FOR IUD REMOVAL: Primary | ICD-10-CM

## 2022-08-15 PROCEDURE — 58301 REMOVE INTRAUTERINE DEVICE: CPT | Performed by: OBSTETRICS & GYNECOLOGY

## 2022-08-15 NOTE — PATIENT INSTRUCTIONS
If you are planning on getting pregnant, I recommend the following:    - TIMED INTERCOURSE: Have intercourse every other day starting on day 11 of your cycle through day 16    OR    - OVULATION PREDICTOR KIT: Purchase an ovulatory predictor kit for a better estimate of when you ovulate which allows you to time intercourse more precisely    It can take up to a year to achieve a pregnancy  We will do a workup if you have not become pregnant in one year (or 6 months if over the age of 28)    Recommendations for all women considering pregnancy:    - Start taking prenatal vitamins with at least 414LCR or 0 2JB of folic acid (it helps prevent brain and spinal cord defects) and most important to take prior to getting pregnant  You may need to take more depending on your medical history  - Adopt a healthy lifestyle if you do not already have one: Focus on a balanced diet that includes plenty of fresh fruits and vegetables, dairy, lean protein, nuts, whole grains, and minimizes sugar and simple carbohydrates  - Optimize your weight - ideal Body Mass Index is between 20-25    - Start exercising regularly - current recommendation is to exercise 30 minutes most days of the week    - Minimize alcohol, smoking and illegal drugs    - Make a list of all medical problems (diabetes, convulsions), family history of genetic problems, working conditions and immunizations    - Make sure you are up to date on all your immunizations    Good books to help you prepare for pregnancy:    [address] to a Healthy Pregnancy    The Pregnancy Countdown Book: Nine Months of Practical Tips, Useful Advice, and Uncensored Truths    Expecting 411: The Insider's Guide to Pregnancy and Childbirth     Expecting Better: Why the Conventional Pregnancy Percival Is Wrong--and What You Really Need to Know    Please let me know if you are not pregnant after trying for 6-12 months or if you have any other questions

## 2023-03-28 ENCOUNTER — TELEPHONE (OUTPATIENT)
Facility: HOSPITAL | Age: 38
End: 2023-03-28

## 2023-03-28 ENCOUNTER — OFFICE VISIT (OUTPATIENT)
Dept: OBGYN CLINIC | Facility: CLINIC | Age: 38
End: 2023-03-28

## 2023-03-28 VITALS
WEIGHT: 193 LBS | SYSTOLIC BLOOD PRESSURE: 124 MMHG | BODY MASS INDEX: 27.63 KG/M2 | HEIGHT: 70 IN | DIASTOLIC BLOOD PRESSURE: 72 MMHG

## 2023-03-28 DIAGNOSIS — N91.2 AMENORRHEA: Primary | ICD-10-CM

## 2023-03-28 DIAGNOSIS — R11.0 NAUSEA: ICD-10-CM

## 2023-03-28 RX ORDER — ONDANSETRON 8 MG/1
8 TABLET, ORALLY DISINTEGRATING ORAL EVERY 8 HOURS PRN
Qty: 20 TABLET | Refills: 2 | Status: SHIPPED | OUTPATIENT
Start: 2023-03-28

## 2023-03-28 NOTE — PROGRESS NOTES
Assessment/Plan:  - Viable IUP @ 8w 4d EGA  - SOFIA  11-3-2023  - Continue PNV  - Patient to call for concerns  - RTO 3 weeks for OB intake    Encounter Diagnosis     ICD-10-CM    1  Amenorrhea  N91 2 AMB US OB < 14 weeks single or first gestation level 1     Ambulatory Referral to Maternal Fetal Medicine      2  Nausea  R11 0 ondansetron (Zofran ODT) 8 mg disintegrating tablet               Subjective:       Patient ID: Jessica Cortes 1985        Jessica Cortes is a 41 y o  Ratna Master presenting to the office for pregnancy confirmation  Patient's last menstrual period was 2023 (approximate)  , placing her at 9w5d today with SOFIA of 10-  She is feeling nauseous       OB History    Para Term  AB Living   2 1 1     1   SAB IAB Ectopic Multiple Live Births         0 1      # Outcome Date GA Lbr Michel/2nd Weight Sex Delivery Anes PTL Lv   2 Current            1 Term 19 39w1d / 01:20 3912 g (8 lb 10 oz) F Vag-Spont EPI  TIM         The following portions of the patient's history were reviewed and updated as appropriate: allergies, current medications, past family history, past medical history, past social history, past surgical history and problem list     Allergies:  Patient has no known allergies  Medications:    Current Outpatient Medications:   •  calcium carbonate (TUMS) 500 mg chewable tablet, Chew 1 tablet daily, Disp: , Rfl:   •  ondansetron (Zofran ODT) 8 mg disintegrating tablet, Take 1 tablet (8 mg total) by mouth every 8 (eight) hours as needed for nausea or vomiting, Disp: 20 tablet, Rfl: 2  •  Prenatal MV-Min-Fe Fum-FA-DHA (PRENATAL+DHA) 28-0 975 & 200 MG MISC, Take 1 tablet by mouth daily, Disp: , Rfl:       Review of Systems:   Review of Systems   Constitutional: Positive for fatigue  Negative for chills, fever and unexpected weight change  Respiratory: Negative for shortness of breath  Cardiovascular: Negative for chest pain  Gastrointestinal: Positive for nausea  "Negative for abdominal pain and vomiting  Skin: Negative for rash  Objective:       Visit Vitals  /72   Ht 5' 10\" (1 778 m)   Wt 87 5 kg (193 lb)   LMP 01/19/2023 (Approximate)   BMI 27 69 kg/m²   OB Status Pregnant   Smoking Status Never   BSA 2 06 m²        GEN: The patient was alert and oriented x3, pleasant well-appearing female in no acute distress  CV: Regular rate  PULM: nonlabored respirations  MSK: Normal gait  : WNL  Skin: warm, dry  Neuro: no focal deficits  Psych: normal affect and judgement, cooperative    Ultrasound:     Viability US     Gestational sac: present               Location: intrauterine  Yolk sac: present  Fetal pole: present               CRL: 2 02 cm = 8w4d  Cardiac activity: present                Rate: 173 bpm     Ovaries: normal appearing bilaterally  Cul de sac: absence of free fluid  Uterus: normal in appearance           Ultrasound Probe Disinfection    A transvaginal ultrasound was performed     Prior to use, disinfection was performed with High Level Disinfection Process (Trophon)  Probe serial number RVRSDE: [id number] was used    Viktor Robles PA-C  03/28/23  9:12 AM            "

## 2023-03-28 NOTE — TELEPHONE ENCOUNTER
Called patient to schedule MFM appointment, based on referral issued to Maternal Fetal Medicine by [facility] office  Left voicemail requesting patient to call back and schedule appointment, with office number for return call [phone]

## 2023-03-30 ENCOUNTER — TELEPHONE (OUTPATIENT)
Facility: HOSPITAL | Age: 38
End: 2023-03-30

## 2023-03-30 ENCOUNTER — TELEPHONE (OUTPATIENT)
Dept: PERINATAL CARE | Facility: CLINIC | Age: 38
End: 2023-03-30

## 2023-03-30 NOTE — TELEPHONE ENCOUNTER
Called patient to schedule MFM appointment, based on referral issued to Maternal Fetal Medicine by [facility] office  Left voicemail requesting patient to call back and schedule appointment, with office number for return call [phone]

## 2023-03-30 NOTE — TELEPHONE ENCOUNTER
Called patient to schedule MFM appointment, based on referral issued to Maternal Fetal Medicine by [facility] office  Left voicemail requesting patient to call back and schedule appointment, with office number for return call [phone]

## 2023-04-03 ENCOUNTER — TELEPHONE (OUTPATIENT)
Facility: HOSPITAL | Age: 38
End: 2023-04-03

## 2023-04-03 NOTE — TELEPHONE ENCOUNTER
Called patient to schedule MFM appointment, based on referral issued to Maternal Fetal Medicine by [facility] office  Our office has tried multiple times to schedule patient as indicated in referral  We have called the patient on 3/28, 3/30 and 4/3  Patient has not answered our calls or returned phone call to office to schedule

## 2023-05-03 ENCOUNTER — ROUTINE PRENATAL (OUTPATIENT)
Facility: HOSPITAL | Age: 38
End: 2023-05-03

## 2023-05-03 VITALS
HEIGHT: 69 IN | BODY MASS INDEX: 28.38 KG/M2 | WEIGHT: 191.58 LBS | SYSTOLIC BLOOD PRESSURE: 118 MMHG | HEART RATE: 112 BPM | DIASTOLIC BLOOD PRESSURE: 64 MMHG

## 2023-05-03 DIAGNOSIS — Z3A.13 13 WEEKS GESTATION OF PREGNANCY: ICD-10-CM

## 2023-05-03 DIAGNOSIS — O09.521 ELDERLY MULTIGRAVIDA, FIRST TRIMESTER: Primary | ICD-10-CM

## 2023-05-03 DIAGNOSIS — O09.291 HX OF PREECLAMPSIA, PRIOR PREGNANCY, CURRENTLY PREGNANT, FIRST TRIMESTER: ICD-10-CM

## 2023-05-03 RX ORDER — ASPIRIN 81 MG/1
162 TABLET, CHEWABLE ORAL
Qty: 180 TABLET | Refills: 1 | Status: SHIPPED | OUTPATIENT
Start: 2023-05-03 | End: 2023-08-01

## 2023-05-03 NOTE — LETTER
May 3, 2023     Andrez Zuleta MD  [address]  [address]  [place] [address]    Patient: Damian Catalan   YOB: 1985   Date of Visit: 5/3/2023       Dear Dr Rupal Sanchez:    Thank you for referring Suad Navarrete to me for evaluation  Below are my notes for this consultation  If you have questions, please do not hesitate to call me  I look forward to following your patient along with you           Sincerely,        Piyush Medellin MD        CC: No Recipients  Piyush Medellin MD  5/3/2023  4:01 PM  Sign when Signing Visit  Please refer to the [facility] ultrasound report in Ob Procedures for additional information regarding today's visit

## 2023-05-03 NOTE — PROGRESS NOTES
Please refer to the [facility] ultrasound report in Ob Procedures for additional information regarding today's visit

## 2023-05-03 NOTE — PROGRESS NOTES
"Patient chose to have [facility] Non-invasive Prenatal Screen with fetal sex  Patient given brochure and is aware [facility] will contact their insurance and coordinate coverage  Patient made aware she will need to respond to text message or e-mail from Eric Rodriguez within 2 business days or testing will be run through insurance  Patient informed text message will come from area code  \"415\"  Provided [employer] # [phone] and web site : [email or web address]  \"[place] your test online\" card with barcode and test tube ID provided to patient  Reviewed [facility]'s web site states 5-7 business days for results via their portal    [employer] message will be sent to patient when MFM receives results /provider reviews  2 vials of blood drawn from right arm by Sharif Mederos  Patient tolerated blood draw without difficulty  Specimens labeled with patient identifiers (name, date of birth, specimen collection date), order and specimen were verified with patient, packed and sent via [employer]  Copy of lab order scanned to Epic media  Maternal Fetal Medicine will have results in approximately 7-10 business days and will call patient or notify via [address]  Patient aware viewing lab result online will reveal fetal sex if ordered  Patient verbalized understanding of all instructions and no questions at this time    "

## 2023-05-11 ENCOUNTER — ROUTINE PRENATAL (OUTPATIENT)
Dept: OBGYN CLINIC | Facility: CLINIC | Age: 38
End: 2023-05-11

## 2023-05-11 VITALS — BODY MASS INDEX: 28.21 KG/M2 | SYSTOLIC BLOOD PRESSURE: 114 MMHG | WEIGHT: 191 LBS | DIASTOLIC BLOOD PRESSURE: 68 MMHG

## 2023-05-11 DIAGNOSIS — O09.522 AMA (ADVANCED MATERNAL AGE) MULTIGRAVIDA 35+, SECOND TRIMESTER: Primary | ICD-10-CM

## 2023-05-11 DIAGNOSIS — Z3A.14 14 WEEKS GESTATION OF PREGNANCY: ICD-10-CM

## 2023-05-11 NOTE — PROGRESS NOTES
Pt is here for her 14w prenatal  Pap and gc due today  Pt denies any swelling, leakage, bleeding, pressure, and contractions

## 2023-05-11 NOTE — PROGRESS NOTES
Patient is a 42 YO  female presenting to the office at 14 6 weeks for routine OB care  BP: 114/68  TWlb  Fetal Movement: yes    41 y o   female at 14w6d (Estimated Date of Delivery: 11/3/23) for PNV  Pre-Katerina Vitals    Flowsheet Row Most Recent Value   Prenatal Assessment    Movement Present   Prenatal Vitals    Blood Pressure 114/68   Weight - Scale 86 6 kg (191 lb)   Urine Albumin/Glucose    Dilation/Effacement/Station    Vaginal Drainage    Edema          kg (4 lb)    Cramping: no  Bleeding: no  LOF: no  NT/13 week scan scheduled: yes  Anatomy scan scheduled   AFP ordered if indicated: yes  Prenatal labs complete (including Heb B, HIV): yes; date completed 23  Pap collected: yes  GC collected:yes  OK to transfuse and code  Oriented to practice/delivery location  Reviewed precautions  Call for concerns  RTO 4 weeks

## 2023-05-17 LAB
C TRACH RRNA CVX QL NAA+PROBE: NEGATIVE
CYTOLOGIST CVX/VAG CYTO: NORMAL
DX ICD CODE: NORMAL
HPV GENOTYPE REFLEX: NORMAL
HPV I/H RISK 4 DNA CVX QL PROBE+SIG AMP: NEGATIVE
Lab: NORMAL
N GONORRHOEA RRNA CVX QL NAA+PROBE: NEGATIVE
OTHER STN SPEC: NORMAL
PATH REPORT.FINAL DX SPEC: NORMAL
SL AMB NOTE:: NORMAL
SL AMB SPECIMEN ADEQUACY: NORMAL
SL AMB TEST METHODOLOGY: NORMAL

## 2023-06-12 ENCOUNTER — ROUTINE PRENATAL (OUTPATIENT)
Dept: OBGYN CLINIC | Facility: CLINIC | Age: 38
End: 2023-06-12

## 2023-06-12 VITALS — BODY MASS INDEX: 28.21 KG/M2 | SYSTOLIC BLOOD PRESSURE: 118 MMHG | DIASTOLIC BLOOD PRESSURE: 64 MMHG | WEIGHT: 191 LBS

## 2023-06-12 DIAGNOSIS — Z3A.19 19 WEEKS GESTATION OF PREGNANCY: ICD-10-CM

## 2023-06-12 DIAGNOSIS — O99.619 GASTROESOPHAGEAL REFLUX IN PREGNANCY: Primary | ICD-10-CM

## 2023-06-12 DIAGNOSIS — R11.0 NAUSEA: ICD-10-CM

## 2023-06-12 DIAGNOSIS — O09.522 MULTIGRAVIDA OF ADVANCED MATERNAL AGE IN SECOND TRIMESTER: ICD-10-CM

## 2023-06-12 DIAGNOSIS — K21.9 GASTROESOPHAGEAL REFLUX IN PREGNANCY: Primary | ICD-10-CM

## 2023-06-12 PROCEDURE — PNV: Performed by: OBSTETRICS & GYNECOLOGY

## 2023-06-12 RX ORDER — FAMOTIDINE 20 MG/1
20 TABLET, FILM COATED ORAL 2 TIMES DAILY
Qty: 60 TABLET | Refills: 3 | Status: SHIPPED | OUTPATIENT
Start: 2023-06-12

## 2023-06-12 RX ORDER — ONDANSETRON 8 MG/1
8 TABLET, ORALLY DISINTEGRATING ORAL EVERY 8 HOURS PRN
Qty: 20 TABLET | Refills: 2 | Status: SHIPPED | OUTPATIENT
Start: 2023-06-12

## 2023-06-12 NOTE — PROGRESS NOTES
41 y o   female at 19w3d (Estimated Date of Delivery: 11/3/23) for PNV  She is still having some nausea and heartburn and would like a refill of her Zofran  Pre- Vitals    Flowsheet Row Most Recent Value   Prenatal Assessment    Fetal Heart Rate 155   Fundal Height (cm) 19 cm   Movement Present   Prenatal Vitals    Blood Pressure 118/64   Weight - Scale 86 6 kg (191 lb)   Urine Albumin/Glucose    Dilation/Effacement/Station    Vaginal Drainage    Edema          kg (4 lb)    Leakage of fluid: no  Vaginal bleeding: no  Contractions/Cramping: no  Fetal movement: yes    RTO in 4 weeks  Diagnoses and all orders for this visit:    Gastroesophageal reflux in pregnancy  -     famotidine (PEPCID) 20 mg tablet; Take 1 tablet (20 mg total) by mouth 2 (two) times a day    19 weeks gestation of pregnancy    Multigravida of advanced maternal age in second trimester    Nausea  -     ondansetron (Zofran ODT) 8 mg disintegrating tablet;  Take 1 tablet (8 mg total) by mouth every 8 (eight) hours as needed for nausea or vomiting

## 2023-06-28 ENCOUNTER — ROUTINE PRENATAL (OUTPATIENT)
Facility: HOSPITAL | Age: 38
End: 2023-06-28
Payer: COMMERCIAL

## 2023-06-28 VITALS
SYSTOLIC BLOOD PRESSURE: 132 MMHG | WEIGHT: 193.34 LBS | DIASTOLIC BLOOD PRESSURE: 78 MMHG | BODY MASS INDEX: 28.64 KG/M2 | HEART RATE: 90 BPM | HEIGHT: 69 IN

## 2023-06-28 DIAGNOSIS — Z3A.21 21 WEEKS GESTATION OF PREGNANCY: ICD-10-CM

## 2023-06-28 DIAGNOSIS — O34.12 LEIOMYOMA OF UTERUS AFFECTING PREGNANCY IN SECOND TRIMESTER: ICD-10-CM

## 2023-06-28 DIAGNOSIS — O09.522 MULTIGRAVIDA OF ADVANCED MATERNAL AGE IN SECOND TRIMESTER: Primary | ICD-10-CM

## 2023-06-28 DIAGNOSIS — D25.9 LEIOMYOMA OF UTERUS AFFECTING PREGNANCY IN SECOND TRIMESTER: ICD-10-CM

## 2023-06-28 PROCEDURE — 76811 OB US DETAILED SNGL FETUS: CPT | Performed by: OBSTETRICS & GYNECOLOGY

## 2023-06-28 PROCEDURE — 76817 TRANSVAGINAL US OBSTETRIC: CPT | Performed by: OBSTETRICS & GYNECOLOGY

## 2023-06-28 NOTE — PROGRESS NOTES
Ultrasound Probe Disinfection    A transvaginal ultrasound was performed  Prior to use, disinfection was performed with High Level Disinfection Process (GLGon)  Probe serial number A1: [id number] was used        [facility]  06/28/23  9:21 AM

## 2023-06-28 NOTE — LETTER
June 29, 2023     [address]  [address] [facility]    Patient: Fabián Cid   YOB: 1985   Date of Visit: 6/28/2023       Dear Ms Encinas: Thank you for referring Felicita Arreola to me for evaluation  Below are my notes for this consultation  If you have questions, please do not hesitate to call me  I look forward to following your patient along with you  Sincerely,        Abdirashid Day MD        CC: No Recipients    Abdirashid Day MD  6/29/2023  6:31 PM  Sign when Signing Visit  A fetal ultrasound was completed  See Ob procedures in Epic for an interpretation and recommendations  Do not hesitate to contact us in [facility] if you have questions  Bacilio Greco MD, [facility]  Maternal Fetal Medicine

## 2023-06-29 PROBLEM — O34.10 UTERINE FIBROIDS AFFECTING PREGNANCY: Status: ACTIVE | Noted: 2023-06-29

## 2023-06-29 PROBLEM — D25.9 UTERINE FIBROIDS AFFECTING PREGNANCY: Status: ACTIVE | Noted: 2023-06-29

## 2023-06-29 PROBLEM — Z3A.21 21 WEEKS GESTATION OF PREGNANCY: Status: ACTIVE | Noted: 2023-06-12

## 2023-06-29 NOTE — PROGRESS NOTES
A fetal ultrasound was completed  See Ob procedures in Epic for an interpretation and recommendations  Do not hesitate to contact us in [facility] if you have questions  Bacilio Greco MD, [address] [facility]  Maternal Fetal Medicine

## 2023-07-05 ENCOUNTER — ROUTINE PRENATAL (OUTPATIENT)
Dept: OBGYN CLINIC | Facility: CLINIC | Age: 38
End: 2023-07-05

## 2023-07-05 VITALS — SYSTOLIC BLOOD PRESSURE: 122 MMHG | BODY MASS INDEX: 28.8 KG/M2 | DIASTOLIC BLOOD PRESSURE: 74 MMHG | WEIGHT: 195 LBS

## 2023-07-05 DIAGNOSIS — O09.522 MULTIGRAVIDA OF ADVANCED MATERNAL AGE IN SECOND TRIMESTER: Primary | ICD-10-CM

## 2023-07-05 DIAGNOSIS — Z3A.22 22 WEEKS GESTATION OF PREGNANCY: ICD-10-CM

## 2023-07-05 PROCEDURE — PNV: Performed by: PHYSICIAN ASSISTANT

## 2023-07-05 NOTE — PROGRESS NOTES
Pt is here for her 22w prenatal. Labs ordered. Pt denies any swelling,leakage,bleeding,pressure, and contractions. Pt has had some random cramping. Pt doing well no concerns.

## 2023-07-05 NOTE — PROGRESS NOTES
Patient is a 40 YO  female presenting to the office at 22.5 weeks for routine OB care.    BP: 122/74  TWlb  Fetal Movement: yes good movement  Feeling well today  Denies LOF, CTX, VB  Had normal anatomy scan  28 week labs ordered   Reviewed precautions  Call for concerns  RTO 4 weeks

## 2023-07-30 NOTE — PROGRESS NOTES
Please refer to the [facility] ultrasound report in Ob Procedures for additional information regarding today's visit

## 2023-08-01 ENCOUNTER — ULTRASOUND (OUTPATIENT)
Facility: HOSPITAL | Age: 38
End: 2023-08-01
Payer: COMMERCIAL

## 2023-08-01 VITALS
SYSTOLIC BLOOD PRESSURE: 126 MMHG | BODY MASS INDEX: 29.06 KG/M2 | HEART RATE: 93 BPM | DIASTOLIC BLOOD PRESSURE: 78 MMHG | HEIGHT: 69 IN | WEIGHT: 196.2 LBS

## 2023-08-01 DIAGNOSIS — D25.9 LEIOMYOMA OF UTERUS AFFECTING PREGNANCY IN SECOND TRIMESTER: ICD-10-CM

## 2023-08-01 DIAGNOSIS — O34.12 LEIOMYOMA OF UTERUS AFFECTING PREGNANCY IN SECOND TRIMESTER: ICD-10-CM

## 2023-08-01 DIAGNOSIS — Z3A.26 26 WEEKS GESTATION OF PREGNANCY: Primary | ICD-10-CM

## 2023-08-01 DIAGNOSIS — O09.522 MULTIGRAVIDA OF ADVANCED MATERNAL AGE IN SECOND TRIMESTER: ICD-10-CM

## 2023-08-01 PROCEDURE — 76816 OB US FOLLOW-UP PER FETUS: CPT | Performed by: OBSTETRICS & GYNECOLOGY

## 2023-08-01 NOTE — LETTER
August 1, 2023     Adam Ryan MD  [address] [facility]  [address]  [address] [facility]    Patient: Orestes Hodges   YOB: 1985   Date of Visit: 8/1/2023       Dear Dr. Marianna Rinne:    Thank you for referring Shyla Velazquez to me for evaluation. Below are my notes for this consultation. If you have questions, please do not hesitate to call me. I look forward to following your patient along with you.          Sincerely,        Wilmar Cabrera MD        CC: No Recipients    Wilmar Cabrera MD  7/30/2023 12:54 PM  Sign when Signing Visit  Please refer to the [facility] ultrasound report in Ob Procedures for additional information regarding today's visit

## 2023-08-02 ENCOUNTER — ROUTINE PRENATAL (OUTPATIENT)
Dept: OBGYN CLINIC | Facility: CLINIC | Age: 38
End: 2023-08-02

## 2023-08-02 VITALS — DIASTOLIC BLOOD PRESSURE: 76 MMHG | SYSTOLIC BLOOD PRESSURE: 128 MMHG | WEIGHT: 196 LBS | BODY MASS INDEX: 28.94 KG/M2

## 2023-08-02 DIAGNOSIS — O34.12 LEIOMYOMA OF UTERUS AFFECTING PREGNANCY IN SECOND TRIMESTER: Primary | ICD-10-CM

## 2023-08-02 DIAGNOSIS — D25.9 LEIOMYOMA OF UTERUS AFFECTING PREGNANCY IN SECOND TRIMESTER: Primary | ICD-10-CM

## 2023-08-02 DIAGNOSIS — Z3A.26 26 WEEKS GESTATION OF PREGNANCY: ICD-10-CM

## 2023-08-02 PROCEDURE — PNV: Performed by: OBSTETRICS & GYNECOLOGY

## 2023-08-02 NOTE — PROGRESS NOTES
41 y.o.  female at 27w6d (Estimated Date of Delivery: 11/3/23) for PNV.     Pre-Katerina Vitals    Flowsheet Row Most Recent Value   Prenatal Assessment    Movement Present   Prenatal Vitals    Blood Pressure 128/76   Weight - Scale 88.9 kg (196 lb)   Urine Albumin/Glucose    Dilation/Effacement/Station    Vaginal Drainage    Edema         TW.082 kg (9 lb)  Doing well 2nd trimester  Encouraged to complete labs in next 1-2 weeks  Growth u/s at 34 weeks

## 2023-08-16 LAB
ERYTHROCYTE [DISTWIDTH] IN BLOOD BY AUTOMATED COUNT: 12.7 % (ref 11.7–15.4)
GLUCOSE 1H P 50 G GLC PO SERPL-MCNC: 121 MG/DL (ref 70–139)
HCT VFR BLD AUTO: 33.5 % (ref 34–46.6)
HGB BLD-MCNC: 11 G/DL (ref 11.1–15.9)
MCH RBC QN AUTO: 28.9 PG (ref 26.6–33)
MCHC RBC AUTO-ENTMCNC: 32.8 G/DL (ref 31.5–35.7)
MCV RBC AUTO: 88 FL (ref 79–97)
PLATELET # BLD AUTO: 243 X10E3/UL (ref 150–450)
RBC # BLD AUTO: 3.8 X10E6/UL (ref 3.77–5.28)
WBC # BLD AUTO: 9.7 X10E3/UL (ref 3.4–10.8)

## 2023-08-31 ENCOUNTER — ROUTINE PRENATAL (OUTPATIENT)
Dept: OBGYN CLINIC | Facility: CLINIC | Age: 38
End: 2023-08-31

## 2023-08-31 VITALS — BODY MASS INDEX: 29.39 KG/M2 | WEIGHT: 199 LBS | SYSTOLIC BLOOD PRESSURE: 120 MMHG | DIASTOLIC BLOOD PRESSURE: 80 MMHG

## 2023-08-31 DIAGNOSIS — Z3A.30 30 WEEKS GESTATION OF PREGNANCY: ICD-10-CM

## 2023-08-31 DIAGNOSIS — O09.523 AMA (ADVANCED MATERNAL AGE) MULTIGRAVIDA 35+, THIRD TRIMESTER: Primary | ICD-10-CM

## 2023-08-31 DIAGNOSIS — O09.293 HISTORY OF PRE-ECLAMPSIA IN PRIOR PREGNANCY, CURRENTLY PREGNANT IN THIRD TRIMESTER: ICD-10-CM

## 2023-08-31 DIAGNOSIS — Z23 NEED FOR TDAP VACCINATION: ICD-10-CM

## 2023-08-31 LAB
DME PARACHUTE DELIVERY DATE REQUESTED: NORMAL
DME PARACHUTE ITEM DESCRIPTION: NORMAL
DME PARACHUTE ORDER STATUS: NORMAL
DME PARACHUTE SUPPLIER NAME: NORMAL
DME PARACHUTE SUPPLIER PHONE: NORMAL

## 2023-08-31 NOTE — PROGRESS NOTES
Patient is a 40 YO  female presenting to the office at 30.6 weeks for routine OB care. BP: 120/80  TWlb  Fetal Movement: yes good movement  Feeling well today  Denies LOF, CTX, VB  Discussed third trimester teaching  Reviewed fetal kick counts, normal FM  Reviewed signs of PTL  Reviewed red folder, consents, birth plan  Delivery consent obtained   Breastfeeding: plans to  Breast Pump: ordered  TDAP: received  FLU Vaccine: received  COVID Vaccine: recommend  28 Week Labs:  WNL  RTO 2 weeks for routine OB F/U

## 2023-08-31 NOTE — PROGRESS NOTES
Pt is here for her 30w prenatal. Pt denies any swelling, leakage,bleeding,pressure, and contractions. Red folder,tdap, and breast pump due. pt would like to discuss that she is now having to drive 1hr plus to work and if she should have any breaks during.

## 2023-09-19 ENCOUNTER — HOSPITAL ENCOUNTER (OUTPATIENT)
Facility: HOSPITAL | Age: 38
Discharge: HOME | End: 2023-09-19
Attending: OBSTETRICS & GYNECOLOGY | Admitting: OBSTETRICS & GYNECOLOGY
Payer: COMMERCIAL

## 2023-09-19 ENCOUNTER — ROUTINE PRENATAL (OUTPATIENT)
Dept: OBGYN CLINIC | Facility: CLINIC | Age: 38
End: 2023-09-19

## 2023-09-19 VITALS
RESPIRATION RATE: 16 BRPM | BODY MASS INDEX: 29.03 KG/M2 | HEIGHT: 69 IN | WEIGHT: 196 LBS | TEMPERATURE: 98.7 F | HEART RATE: 89 BPM | DIASTOLIC BLOOD PRESSURE: 90 MMHG | SYSTOLIC BLOOD PRESSURE: 146 MMHG

## 2023-09-19 VITALS — WEIGHT: 196 LBS | BODY MASS INDEX: 28.94 KG/M2 | DIASTOLIC BLOOD PRESSURE: 84 MMHG | SYSTOLIC BLOOD PRESSURE: 146 MMHG

## 2023-09-19 DIAGNOSIS — O09.523 AMA (ADVANCED MATERNAL AGE) MULTIGRAVIDA 35+, THIRD TRIMESTER: ICD-10-CM

## 2023-09-19 DIAGNOSIS — O09.293 HISTORY OF PRE-ECLAMPSIA IN PRIOR PREGNANCY, CURRENTLY PREGNANT IN THIRD TRIMESTER: Primary | ICD-10-CM

## 2023-09-19 DIAGNOSIS — Z3A.33 33 WEEKS GESTATION OF PREGNANCY: ICD-10-CM

## 2023-09-19 PROBLEM — O13.9 GESTATIONAL HYPERTENSION: Status: ACTIVE | Noted: 2023-09-19

## 2023-09-19 LAB
ALBUMIN SERPL BCP-MCNC: 3.7 G/DL (ref 3.5–5)
ALP SERPL-CCNC: 67 U/L (ref 34–104)
ALT SERPL W P-5'-P-CCNC: 9 U/L (ref 7–52)
ANION GAP SERPL CALCULATED.3IONS-SCNC: 10 MMOL/L
AST SERPL W P-5'-P-CCNC: 15 U/L (ref 13–39)
BILIRUB SERPL-MCNC: 0.21 MG/DL (ref 0.2–1)
BUN SERPL-MCNC: 8 MG/DL (ref 5–25)
CALCIUM SERPL-MCNC: 8.9 MG/DL (ref 8.4–10.2)
CHLORIDE SERPL-SCNC: 104 MMOL/L (ref 96–108)
CO2 SERPL-SCNC: 21 MMOL/L (ref 21–32)
CREAT SERPL-MCNC: 0.54 MG/DL (ref 0.6–1.3)
CREAT UR-MCNC: 26.4 MG/DL
ERYTHROCYTE [DISTWIDTH] IN BLOOD BY AUTOMATED COUNT: 13.7 % (ref 11.6–15.1)
GFR SERPL CREATININE-BSD FRML MDRD: 120 ML/MIN/1.73SQ M
GLUCOSE SERPL-MCNC: 96 MG/DL (ref 65–140)
HCT VFR BLD AUTO: 32.2 % (ref 34.8–46.1)
HGB BLD-MCNC: 10.8 G/DL (ref 11.5–15.4)
MCH RBC QN AUTO: 29.4 PG (ref 26.8–34.3)
MCHC RBC AUTO-ENTMCNC: 33.5 G/DL (ref 31.4–37.4)
MCV RBC AUTO: 88 FL (ref 82–98)
PLATELET # BLD AUTO: 232 THOUSANDS/UL (ref 149–390)
PMV BLD AUTO: 11.3 FL (ref 8.9–12.7)
POTASSIUM SERPL-SCNC: 3.4 MMOL/L (ref 3.5–5.3)
PROT SERPL-MCNC: 7 G/DL (ref 6.4–8.4)
PROT UR-MCNC: 4 MG/DL
PROT/CREAT UR: 0.15 MG/G{CREAT} (ref 0–0.1)
RBC # BLD AUTO: 3.67 MILLION/UL (ref 3.81–5.12)
SODIUM SERPL-SCNC: 135 MMOL/L (ref 135–147)
WBC # BLD AUTO: 10.08 THOUSAND/UL (ref 4.31–10.16)

## 2023-09-19 PROCEDURE — G0463 HOSPITAL OUTPT CLINIC VISIT: HCPCS

## 2023-09-19 PROCEDURE — 85027 COMPLETE CBC AUTOMATED: CPT

## 2023-09-19 PROCEDURE — 82570 ASSAY OF URINE CREATININE: CPT

## 2023-09-19 PROCEDURE — 99213 OFFICE O/P EST LOW 20 MIN: CPT

## 2023-09-19 PROCEDURE — 84156 ASSAY OF PROTEIN URINE: CPT

## 2023-09-19 PROCEDURE — 80053 COMPREHEN METABOLIC PANEL: CPT

## 2023-09-19 PROCEDURE — NC001 PR NO CHARGE: Performed by: OBSTETRICS & GYNECOLOGY

## 2023-09-19 PROCEDURE — PNV: Performed by: OBSTETRICS & GYNECOLOGY

## 2023-09-19 NOTE — PROGRESS NOTES
L&D Triage Note - OB/GYN  Michele Conner 41 y.o. female MRN: [id number]  Unit/Bed#: LD TRIAGE  Encounter: [id number]      Assessment:  41 y.o. Mauricio Rios at 33w4d with gestational hypertension    Plan:  1. Gestational hypertension  At this time, patient meets criteria for gestational hypertension. Labs WNL. Blood pressures in triage ranging as below:    Systolic (62FVJ), AF , Min:128 , OTN:256   Diastolic (51UZT), OBN:51, Min:81, Max:92    Discussed recommendation for starting twice weekly APFS until delivery which would be indicated at 37w0d. Reviewed warning s/s of preeclampsia that would prompt calling the office and possible triage evaluation. Will send message to [facility] office to set up scheduling. Next  center visit is 23. As patient has not had any severe range blood pressures and is overall feeling well since prenatal visit earlier in afternoon, stable for discharge home    D/w Dr. Zahra Hercules       ______________________________________________________________________      Chief Compliant: my blood pressure was high    TIME: 1900  Subjective:  41 y.o.  at 33w4d presents for evaluation of elevated blood pressures. Patient has history of gestational hypertension with her last pregnancy. She was seen in the office earlier today and had an elevated blood pressure of 146/84. She said she felt "a little off" today which she reports being similar to when she had high blood pressures in her last pregnancy. Denies headaches, vision changes, chest pain, shortness of breath, lower extremity pain/tenderness.  Denies contractions, leaking fluid, decreased fetal movement, vaginal bleeding      Objective:    Vitals:    23 1850   BP: 146/90   Pulse: 89   Resp:    Temp:        SVE: deferred  FHT:  130 bpm / Moderate 6 - 25 bpm / 15x15 accelerations, no decelerations  [place]: quiet    Lab Results   Component Value Date    WBC 10.08 2023    HGB 10.8 (L) 2023    HCT 32.2 (L) 2023 MCV 88 09/19/2023     09/19/2023     Lab Results   Component Value Date    SODIUM 135 09/19/2023    K 3.4 (L) 09/19/2023     09/19/2023    CO2 21 09/19/2023    AGAP 10 09/19/2023    BUN 8 09/19/2023    CREATININE 0.54 (L) 09/19/2023    GLUC 96 09/19/2023    CALCIUM 8.9 09/19/2023    AST 15 09/19/2023    ALT 9 09/19/2023    ALKPHOS 67 09/19/2023    TP 7.0 09/19/2023    TBILI 0.21 09/19/2023    EGFR 120 09/19/2023     Urine P/Cr 0.15      Natalya Almaguer MD   OB/GYN PGY-4   9/19/2023 6:58 PM

## 2023-09-19 NOTE — PROGRESS NOTES
41 y.o.  female at 33w4d (Estimated Date of Delivery: 11/3/23) for PNV. Pre-Katerina Vitals    Flowsheet Row Most Recent Value   Prenatal Assessment    Fetal Heart Rate 125   Movement Present   Prenatal Vitals    Blood Pressure 146/84   Weight - Scale 88.9 kg (196 lb)   Urine Albumin/Glucose    Dilation/Effacement/Station    Vaginal Drainage    Edema         TW.082 kg (9 lb)    Leakage of fluid: no  Vaginal bleeding: no  Contractions/Cramping: no  Fetal movement: yes    History of preeclampsia in prior pregnancy -elevated blood pressure today in the office at 146/84. Patient states that she has been feeling "a little off" over the last week or so reminiscent of her prior pregnancy that was affected by preeclampsia. -Discussed hypertensive disorders in pregnancy and discussed the spectrum of gestational hypertension to preeclampsia with severe features. Recommended for patient to be seen at labor and delivery triage due to elevated blood pressure here in the office. Recommend for laboratory evaluation to be done with CBC, CMP, and urine protein creatinine ratio. Discussed possible situations that if her lab work is normal she may carry a diagnosis of gestational hypertension at which point weekly labs and antepartum fetal surveillance is anticipated. If she obtains a diagnosis of preeclampsia without severe features we can also monitor with weekly labs and antepartum fetal surveillance. Severe features become present while she is there in triage for her evaluation then there is a consideration for inpatient admission. Discussed anticipation of induction of labor for indication of hypertensive disorder in pregnancy at 37 weeks. Expressed understanding of all that we have discussed today. She is going over to labor and delivery after this appointment. RTO in 2 weeks.

## 2023-09-22 LAB
DME PARACHUTE DELIVERY DATE ACTUAL: NORMAL
DME PARACHUTE DELIVERY DATE REQUESTED: NORMAL
DME PARACHUTE ITEM DESCRIPTION: NORMAL
DME PARACHUTE ORDER STATUS: NORMAL
DME PARACHUTE SUPPLIER NAME: NORMAL
DME PARACHUTE SUPPLIER PHONE: NORMAL

## 2023-09-26 ENCOUNTER — ULTRASOUND (OUTPATIENT)
Facility: HOSPITAL | Age: 38
End: 2023-09-26
Payer: COMMERCIAL

## 2023-09-26 ENCOUNTER — HOSPITAL ENCOUNTER (OUTPATIENT)
Facility: HOSPITAL | Age: 38
Discharge: HOME | End: 2023-09-26
Attending: OBSTETRICS & GYNECOLOGY | Admitting: OBSTETRICS & GYNECOLOGY
Payer: COMMERCIAL

## 2023-09-26 VITALS
TEMPERATURE: 97.4 F | HEIGHT: 69 IN | WEIGHT: 196.6 LBS | DIASTOLIC BLOOD PRESSURE: 93 MMHG | RESPIRATION RATE: 16 BRPM | BODY MASS INDEX: 29.12 KG/M2 | SYSTOLIC BLOOD PRESSURE: 152 MMHG

## 2023-09-26 VITALS
HEART RATE: 91 BPM | WEIGHT: 196.6 LBS | HEIGHT: 69 IN | BODY MASS INDEX: 29.12 KG/M2 | SYSTOLIC BLOOD PRESSURE: 134 MMHG | DIASTOLIC BLOOD PRESSURE: 88 MMHG

## 2023-09-26 DIAGNOSIS — O09.522 MULTIGRAVIDA OF ADVANCED MATERNAL AGE IN SECOND TRIMESTER: Primary | ICD-10-CM

## 2023-09-26 DIAGNOSIS — Z36.89 ENCOUNTER FOR ULTRASOUND TO CHECK FETAL GROWTH: ICD-10-CM

## 2023-09-26 DIAGNOSIS — O09.293 HISTORY OF PRE-ECLAMPSIA IN PRIOR PREGNANCY, CURRENTLY PREGNANT IN THIRD TRIMESTER: ICD-10-CM

## 2023-09-26 DIAGNOSIS — Z3A.34 34 WEEKS GESTATION OF PREGNANCY: ICD-10-CM

## 2023-09-26 DIAGNOSIS — O13.3 GESTATIONAL HYPERTENSION, THIRD TRIMESTER: ICD-10-CM

## 2023-09-26 PROBLEM — O36.8390 MATERNAL CARE FOR FETAL DECELERATIONS DURING PREGNANCY: Status: ACTIVE | Noted: 2023-09-26

## 2023-09-26 LAB
ALBUMIN SERPL BCP-MCNC: 3.8 G/DL (ref 3.5–5)
ALP SERPL-CCNC: 75 U/L (ref 34–104)
ALT SERPL W P-5'-P-CCNC: 12 U/L (ref 7–52)
ANION GAP SERPL CALCULATED.3IONS-SCNC: 8 MMOL/L
AST SERPL W P-5'-P-CCNC: 13 U/L (ref 13–39)
BILIRUB SERPL-MCNC: 0.24 MG/DL (ref 0.2–1)
BUN SERPL-MCNC: 7 MG/DL (ref 5–25)
CALCIUM SERPL-MCNC: 9.9 MG/DL (ref 8.4–10.2)
CHLORIDE SERPL-SCNC: 105 MMOL/L (ref 96–108)
CO2 SERPL-SCNC: 23 MMOL/L (ref 21–32)
CREAT SERPL-MCNC: 0.55 MG/DL (ref 0.6–1.3)
CREAT UR-MCNC: 32.7 MG/DL
DME PARACHUTE DELIVERY DATE ACTUAL: NORMAL
DME PARACHUTE DELIVERY DATE REQUESTED: NORMAL
DME PARACHUTE ITEM DESCRIPTION: NORMAL
DME PARACHUTE ORDER STATUS: NORMAL
DME PARACHUTE SUPPLIER NAME: NORMAL
DME PARACHUTE SUPPLIER PHONE: NORMAL
ERYTHROCYTE [DISTWIDTH] IN BLOOD BY AUTOMATED COUNT: 13.7 % (ref 11.6–15.1)
GFR SERPL CREATININE-BSD FRML MDRD: 120 ML/MIN/1.73SQ M
GLUCOSE SERPL-MCNC: 89 MG/DL (ref 65–140)
HCT VFR BLD AUTO: 33 % (ref 34.8–46.1)
HGB BLD-MCNC: 11.1 G/DL (ref 11.5–15.4)
MCH RBC QN AUTO: 29.6 PG (ref 26.8–34.3)
MCHC RBC AUTO-ENTMCNC: 33.6 G/DL (ref 31.4–37.4)
MCV RBC AUTO: 88 FL (ref 82–98)
PLATELET # BLD AUTO: 229 THOUSANDS/UL (ref 149–390)
PMV BLD AUTO: 11.1 FL (ref 8.9–12.7)
POTASSIUM SERPL-SCNC: 3.2 MMOL/L (ref 3.5–5.3)
PROT SERPL-MCNC: 7 G/DL (ref 6.4–8.4)
PROT UR-MCNC: 7 MG/DL
PROT/CREAT UR: 0.21 MG/G{CREAT} (ref 0–0.1)
RBC # BLD AUTO: 3.75 MILLION/UL (ref 3.81–5.12)
SODIUM SERPL-SCNC: 136 MMOL/L (ref 135–147)
WBC # BLD AUTO: 10.01 THOUSAND/UL (ref 4.31–10.16)

## 2023-09-26 PROCEDURE — G0463 HOSPITAL OUTPT CLINIC VISIT: HCPCS

## 2023-09-26 PROCEDURE — 80053 COMPREHEN METABOLIC PANEL: CPT

## 2023-09-26 PROCEDURE — 59025 FETAL NON-STRESS TEST: CPT | Performed by: OBSTETRICS & GYNECOLOGY

## 2023-09-26 PROCEDURE — 76816 OB US FOLLOW-UP PER FETUS: CPT | Performed by: OBSTETRICS & GYNECOLOGY

## 2023-09-26 PROCEDURE — 84156 ASSAY OF PROTEIN URINE: CPT

## 2023-09-26 PROCEDURE — 99214 OFFICE O/P EST MOD 30 MIN: CPT

## 2023-09-26 PROCEDURE — NC001 PR NO CHARGE: Performed by: OBSTETRICS & GYNECOLOGY

## 2023-09-26 PROCEDURE — 82570 ASSAY OF URINE CREATININE: CPT

## 2023-09-26 PROCEDURE — 85027 COMPLETE CBC AUTOMATED: CPT

## 2023-09-26 PROCEDURE — 99214 OFFICE O/P EST MOD 30 MIN: CPT | Performed by: OBSTETRICS & GYNECOLOGY

## 2023-09-26 RX ORDER — FAMOTIDINE 20 MG/1
20 TABLET, FILM COATED ORAL ONCE
Status: COMPLETED | OUTPATIENT
Start: 2023-09-26 | End: 2023-09-26

## 2023-09-26 RX ADMIN — FAMOTIDINE 20 MG: 20 TABLET, FILM COATED ORAL at 15:34

## 2023-09-26 NOTE — PROGRESS NOTES
Non-Stress Testing:    Non-Stress test, equipment, procedure, and expected outcomes explained. Reviewed fetal kick counts and when to call OB. Verified patient understanding of fetal kick counts with teach back method. Patient reports feeling daily fetal movements. Patient has no questions or concerns. DR Barajas viewed nonstress test prior to completion of appointment. DR Barajas discussed plan of care with pt, pt receptive to plan of care. Pt escorted to labor and delivery for continued care- report to LOIS Cortes RN

## 2023-09-26 NOTE — PROGRESS NOTES
L&D Triage Note - OB/GYN  Deuce Holden 41 y.o. female MRN: [id number]  Unit/Bed#: LD TRIAGE  Encounter: [id number]    Patient is seen by Linda Lai is a 41 y.o.  at 34w4d here for extended fetal monitoring after deceleration noted in office. PLAN  #1. 2.5 min deceleration on office NST:  · 2 hours of extended monitoring reassuring    #2. Gestational hypertension: elevated pressures in triage today  · Repeat PEC labs today wnl, urine PC 0.21    #3. Dispo: per night team   · Sent home with return precautions. · Will follow up with OBGYN per routine prenatal care. D/w Dr. Anderson Phillip.   ______________    SUBJECTIVE    SOFIA: Estimated Date of Delivery: 11/3/23    HPI:  41 y.o.  34w4d presents with complaint of deceleration during office NST today. She has no obstetric complaints at this time, including no contractions, no vaginal bleeding, no loss of fluid, and she is feeling good fetal movement. Otherwise, she states that she has been feeling "off" for about a week and think she's getting over a cold, with a sore throat and mild cough. She also describes some mild nausea and heartburn. Of note, she met criteria for gHTN about a week ago, labs at that time were normal (PC 0.15). Contractions: no  Leakage of fluid: no  Vaginal Bleeding: no  Fetal movement: present    Her obstetrical history is significant for previous pregnancy with preeclampsia, gestational hypertension in this pregnancy. ROS:  Constitutional: Mild fatigue  Respiratory: Mild cough  Cardiovascular: Negative    Gastrointestinal: Mild heartburn    OBJECTIVE:  Temp (!) 97.4 °F (36.3 °C) (Temporal)   Resp 16   Ht 5' 9" (1.753 m)   Wt 89.2 kg (196 lb 9.6 oz)   LMP 2023 (Approximate)   BMI 29.03 kg/m²   Body mass index is 29.03 kg/m². Physical Exam  Constitutional:       Appearance: Normal appearance. Cardiovascular:      Rate and Rhythm: Normal rate.    Pulmonary:      Effort: Pulmonary effort is normal.   Abdominal:      Palpations: Abdomen is soft. Skin:     General: Skin is warm and dry. Neurological:      General: No focal deficit present. Mental Status: She is alert and oriented to person, place, and time.        SVE: deferred     FHT:  Baseline: 130  Variability: Moderate 6-25 bpm  Accelerations: present   Decelerations: absent    TOCO: not heriberto       Labs:   Recent Results (from the past 24 hour(s))   CBC and Platelet    Collection Time: 09/26/23  4:28 PM   Result Value Ref Range    WBC 10.01 4.31 - 10.16 Thousand/uL    RBC 3.75 (L) 3.81 - 5.12 Million/uL    Hemoglobin 11.1 (L) 11.5 - 15.4 g/dL    Hematocrit 33.0 (L) 34.8 - 46.1 %    MCV 88 82 - 98 fL    MCH 29.6 26.8 - 34.3 pg    MCHC 33.6 31.4 - 37.4 g/dL    RDW 13.7 11.6 - 15.1 %    Platelets 001 195 - 062 Thousands/uL    MPV 11.1 8.9 - 12.7 fL   Comprehensive metabolic panel    Collection Time: 09/26/23  4:28 PM   Result Value Ref Range    Sodium 136 135 - 147 mmol/L    Potassium 3.2 (L) 3.5 - 5.3 mmol/L    Chloride 105 96 - 108 mmol/L    CO2 23 21 - 32 mmol/L    ANION GAP 8 mmol/L    BUN 7 5 - 25 mg/dL    Creatinine 0.55 (L) 0.60 - 1.30 mg/dL    Glucose 89 65 - 140 mg/dL    Calcium 9.9 8.4 - 10.2 mg/dL    AST 13 13 - 39 U/L    ALT 12 7 - 52 U/L    Alkaline Phosphatase 75 34 - 104 U/L    Total Protein 7.0 6.4 - 8.4 g/dL    Albumin 3.8 3.5 - 5.0 g/dL    Total Bilirubin 0.24 0.20 - 1.00 mg/dL    eGFR 120 ml/min/1.73sq m   Protein / creatinine ratio, urine    Collection Time: 09/26/23  4:28 PM   Result Value Ref Range    Creatinine, Ur 32.7 Reference range not established. mg/dL    Protein Urine Random 7 Reference range not established. mg/dL    Prot/Creat Ratio, Ur 0.21 (H) 0.00 - 0.10         Court Cabrera MD  9/26/2023  3:01 PM

## 2023-09-26 NOTE — PROGRESS NOTES
[address] [facility] [address] [facility] [address]: Yaima Brice was seen today for fetal growth assessment ultrasound and NST for gestational hypertension. Due to deceleration on NST patient sent for L&D monitoring. See ultrasound report under "OB Procedures" tab. The time spent on this established patient on the encounter date included 8 minutes previsit service time reviewing records and precharting, 20 minutes face-to-face service time counseling regarding results and coordinating care, and  8 minutes charting, totalling 36 minutes.   Please don't hesitate to contact our office with any concerns or questions.  -Yoanna Nowak MD

## 2023-09-26 NOTE — PATIENT INSTRUCTIONS
Please go to Labor and Delivery now for evaluation. The address of [address] [place] [address] is [phone] ([facility]).

## 2023-09-28 ENCOUNTER — ROUTINE PRENATAL (OUTPATIENT)
Dept: OBGYN CLINIC | Facility: CLINIC | Age: 38
End: 2023-09-28
Payer: COMMERCIAL

## 2023-09-28 ENCOUNTER — APPOINTMENT (OUTPATIENT)
Dept: LAB | Facility: AMBULARY SURGERY CENTER | Age: 38
End: 2023-09-28
Payer: COMMERCIAL

## 2023-09-28 VITALS — BODY MASS INDEX: 29.03 KG/M2 | WEIGHT: 196.6 LBS | SYSTOLIC BLOOD PRESSURE: 142 MMHG | DIASTOLIC BLOOD PRESSURE: 90 MMHG

## 2023-09-28 DIAGNOSIS — Z3A.34 34 WEEKS GESTATION OF PREGNANCY: ICD-10-CM

## 2023-09-28 DIAGNOSIS — O13.3 GESTATIONAL HYPERTENSION, THIRD TRIMESTER: Primary | ICD-10-CM

## 2023-09-28 DIAGNOSIS — O13.3 GESTATIONAL HYPERTENSION, THIRD TRIMESTER: ICD-10-CM

## 2023-09-28 LAB
ALBUMIN SERPL BCP-MCNC: 3.6 G/DL (ref 3.5–5)
ALP SERPL-CCNC: 65 U/L (ref 34–104)
ALT SERPL W P-5'-P-CCNC: 10 U/L (ref 7–52)
ANION GAP SERPL CALCULATED.3IONS-SCNC: 9 MMOL/L
AST SERPL W P-5'-P-CCNC: 12 U/L (ref 13–39)
BASOPHILS # BLD AUTO: 0.07 THOUSANDS/ÂΜL (ref 0–0.1)
BASOPHILS NFR BLD AUTO: 1 % (ref 0–1)
BILIRUB SERPL-MCNC: 0.23 MG/DL (ref 0.2–1)
BUN SERPL-MCNC: 8 MG/DL (ref 5–25)
CALCIUM SERPL-MCNC: 10.1 MG/DL (ref 8.4–10.2)
CHLORIDE SERPL-SCNC: 105 MMOL/L (ref 96–108)
CO2 SERPL-SCNC: 24 MMOL/L (ref 21–32)
CREAT SERPL-MCNC: 0.69 MG/DL (ref 0.6–1.3)
CREAT UR-MCNC: 91.2 MG/DL
EOSINOPHIL # BLD AUTO: 0.16 THOUSAND/ÂΜL (ref 0–0.61)
EOSINOPHIL NFR BLD AUTO: 2 % (ref 0–6)
ERYTHROCYTE [DISTWIDTH] IN BLOOD BY AUTOMATED COUNT: 13.7 % (ref 11.6–15.1)
GFR SERPL CREATININE-BSD FRML MDRD: 111 ML/MIN/1.73SQ M
GLUCOSE SERPL-MCNC: 87 MG/DL (ref 65–140)
HCT VFR BLD AUTO: 33.4 % (ref 34.8–46.1)
HGB BLD-MCNC: 11.1 G/DL (ref 11.5–15.4)
IMM GRANULOCYTES # BLD AUTO: 0.06 THOUSAND/UL (ref 0–0.2)
IMM GRANULOCYTES NFR BLD AUTO: 1 % (ref 0–2)
LYMPHOCYTES # BLD AUTO: 1.88 THOUSANDS/ÂΜL (ref 0.6–4.47)
LYMPHOCYTES NFR BLD AUTO: 18 % (ref 14–44)
MCH RBC QN AUTO: 29.3 PG (ref 26.8–34.3)
MCHC RBC AUTO-ENTMCNC: 33.2 G/DL (ref 31.4–37.4)
MCV RBC AUTO: 88 FL (ref 82–98)
MONOCYTES # BLD AUTO: 0.54 THOUSAND/ÂΜL (ref 0.17–1.22)
MONOCYTES NFR BLD AUTO: 5 % (ref 4–12)
NEUTROPHILS # BLD AUTO: 7.84 THOUSANDS/ÂΜL (ref 1.85–7.62)
NEUTS SEG NFR BLD AUTO: 73 % (ref 43–75)
NRBC BLD AUTO-RTO: 0 /100 WBCS
PLATELET # BLD AUTO: 264 THOUSANDS/UL (ref 149–390)
PMV BLD AUTO: 11.4 FL (ref 8.9–12.7)
POTASSIUM SERPL-SCNC: 3.2 MMOL/L (ref 3.5–5.3)
PROT SERPL-MCNC: 6.8 G/DL (ref 6.4–8.4)
PROT UR-MCNC: 15 MG/DL
PROT/CREAT UR: 0.16 MG/G{CREAT} (ref 0–0.1)
RBC # BLD AUTO: 3.79 MILLION/UL (ref 3.81–5.12)
SODIUM SERPL-SCNC: 138 MMOL/L (ref 135–147)
WBC # BLD AUTO: 10.55 THOUSAND/UL (ref 4.31–10.16)

## 2023-09-28 PROCEDURE — 59025 FETAL NON-STRESS TEST: CPT | Performed by: OBSTETRICS & GYNECOLOGY

## 2023-09-28 PROCEDURE — 82570 ASSAY OF URINE CREATININE: CPT

## 2023-09-28 PROCEDURE — 36415 COLL VENOUS BLD VENIPUNCTURE: CPT

## 2023-09-28 PROCEDURE — 84156 ASSAY OF PROTEIN URINE: CPT

## 2023-09-28 PROCEDURE — 85025 COMPLETE CBC W/AUTO DIFF WBC: CPT

## 2023-09-28 PROCEDURE — 80053 COMPREHEN METABOLIC PANEL: CPT

## 2023-09-28 NOTE — PROGRESS NOTES
Patient here for NST due to gHTN  BP today 142/90  She states she had a HA last night that resolved with Tylenol  She did experience floaters this AM    Discussed with Dr. Adeel Russo. Pre-E labs for patient to complete at the lab today. Patient to return to tomorrow for BP check. Apt scheduled for 1030.  Discussed if she has a headache tonight she is to take two extra strength tylenol and if headache is not resolved or has blurred vision she is to call into the office

## 2023-09-29 ENCOUNTER — CLINICAL SUPPORT (OUTPATIENT)
Dept: OBGYN CLINIC | Facility: CLINIC | Age: 38
End: 2023-09-29

## 2023-09-29 DIAGNOSIS — O13.3 GESTATIONAL HYPERTENSION, THIRD TRIMESTER: Primary | ICD-10-CM

## 2023-09-29 RX ORDER — NIFEDIPINE 30 MG/1
30 TABLET, EXTENDED RELEASE ORAL DAILY
Qty: 60 TABLET | Refills: 2 | Status: ON HOLD | OUTPATIENT
Start: 2023-09-29 | End: 2023-10-06 | Stop reason: SDUPTHER

## 2023-09-29 NOTE — PROGRESS NOTES
Patient here for repeat BP check   BP today initially 170/98  She states she checked her BP this AM and it was 140/94.  She did have some floaters at that time that resolved on its own   Repeat BP after sitting for a couple minutes was 140/86  Discussed with Dr. Vanessa Miles- patient is to start taking Procardia 30 mg daily  If she has elevated BP's at home or is symptomatic she is to call us   F/u next week on 10/4

## 2023-10-04 ENCOUNTER — APPOINTMENT (OUTPATIENT)
Dept: LAB | Facility: AMBULARY SURGERY CENTER | Age: 38
End: 2023-10-04
Payer: COMMERCIAL

## 2023-10-04 ENCOUNTER — ROUTINE PRENATAL (OUTPATIENT)
Dept: OBGYN CLINIC | Facility: CLINIC | Age: 38
End: 2023-10-04

## 2023-10-04 VITALS — SYSTOLIC BLOOD PRESSURE: 140 MMHG | DIASTOLIC BLOOD PRESSURE: 84 MMHG | BODY MASS INDEX: 29.24 KG/M2 | WEIGHT: 198 LBS

## 2023-10-04 DIAGNOSIS — Z3A.35 35 WEEKS GESTATION OF PREGNANCY: ICD-10-CM

## 2023-10-04 DIAGNOSIS — O09.523 AMA (ADVANCED MATERNAL AGE) MULTIGRAVIDA 35+, THIRD TRIMESTER: ICD-10-CM

## 2023-10-04 DIAGNOSIS — O09.293 HISTORY OF PRE-ECLAMPSIA IN PRIOR PREGNANCY, CURRENTLY PREGNANT IN THIRD TRIMESTER: ICD-10-CM

## 2023-10-04 DIAGNOSIS — O13.3 GESTATIONAL HYPERTENSION, THIRD TRIMESTER: ICD-10-CM

## 2023-10-04 PROBLEM — O36.8390 MATERNAL CARE FOR FETAL DECELERATIONS DURING PREGNANCY: Status: RESOLVED | Noted: 2023-09-26 | Resolved: 2023-10-04

## 2023-10-04 PROCEDURE — PNV: Performed by: OBSTETRICS & GYNECOLOGY

## 2023-10-04 NOTE — PROGRESS NOTES
41 y.o.  female at 35w5d (Estimated Date of Delivery: 11/3/23) for PNV. Pre-Katerina Vitals    Flowsheet Row Most Recent Value   Prenatal Assessment    Fetal Heart Rate 130   Prenatal Vitals    Blood Pressure 140/84   Weight - Scale 89.8 kg (198 lb)   Urine Albumin/Glucose    Dilation/Effacement/Station    Vaginal Drainage    Edema         TW.99 kg (11 lb)    Leakage of fluid: no  Vaginal bleeding: no  Contractions/Cramping: no  Fetal movement: yes    GBS done: Yes  PCN allergy: No  Chlamydia/gonorrhea swab done: Yes  Delivery plan: Discussed with patient recommendation for delivery at 37 weeks due to gestational hypertension. Breech presentation of fetus -discussed treatment options including external cephalic version versus primary  section. Discussed risk related to attempting vaginal delivery with a breech fetus and that is not recommended. - Discussed breech presentation and treatment options including ECV vs primary  section. Reviewed benefits and risks related to ECV procedure. Discussed benefit of avoiding a primary  section and being able to attempt a vaginal delivery if successful version occurs. Reviewed risks related to the procedure including ROM, placental abruption, fetal distress and need for emergent  delivery at time of procedure. Due to risks associated with the procedure reviewed that timing of the procedure is recommended during the 37th week as fetus will be full term but no so close to delivery that success of the procedure is hindered by size of fetus. Reviewed procedure details including option for regional anesthesia which increases the chance of success as well as the use of a uterine relaxant that helps during the procedure as well.   -Patient is scheduled for external cephalic version/primary  section if unsuccessful as well as an induction of labor on 10/13/2023.   External cephalic version is scheduled in the operating room at 10 AM.    Gestational hypertension -currently controlled with Procardia XL 30 mg daily. Elevated blood pressure in office today 140s over 80s. Patient states that since starting the medication she has had a significant improvement of her symptoms including resolution of her headaches as well as floaters that she was having. Patient reports blood pressure checks at home have been mainly in the 130s/80s. She denies any significant issues with tolerating the medication and continues to take it daily. She has standing preeclamptic labs that she is performing each week. - NST performed today. Reactive and reassuring. Labor precautions given. [phone]/[id number] Tanya Arce reviewed. RTO on Friday for NST and WANDA. Sher Chahal

## 2023-10-04 NOTE — PROGRESS NOTES
Routine prenatal 35 weeks, NST, gbs, and g/c due today. Pt is well, questions regarding iol and breech position. Denies vb,lof, and ctx.

## 2023-10-05 ENCOUNTER — HOSPITAL ENCOUNTER (OUTPATIENT)
Facility: HOSPITAL | Age: 38
Discharge: HOME | End: 2023-10-06
Attending: OBSTETRICS & GYNECOLOGY | Admitting: OBSTETRICS & GYNECOLOGY
Payer: COMMERCIAL

## 2023-10-05 ENCOUNTER — NURSE TRIAGE (OUTPATIENT)
Dept: OTHER | Facility: OTHER | Age: 38
End: 2023-10-05

## 2023-10-05 DIAGNOSIS — O13.3 GESTATIONAL HYPERTENSION, THIRD TRIMESTER: ICD-10-CM

## 2023-10-05 PROCEDURE — 86901 BLOOD TYPING SEROLOGIC RH(D): CPT

## 2023-10-05 PROCEDURE — 86900 BLOOD TYPING SEROLOGIC ABO: CPT

## 2023-10-05 PROCEDURE — 86850 RBC ANTIBODY SCREEN: CPT

## 2023-10-05 PROCEDURE — 82570 ASSAY OF URINE CREATININE: CPT

## 2023-10-05 PROCEDURE — 86780 TREPONEMA PALLIDUM: CPT

## 2023-10-05 PROCEDURE — 85025 COMPLETE CBC W/AUTO DIFF WBC: CPT

## 2023-10-05 PROCEDURE — 84156 ASSAY OF PROTEIN URINE: CPT

## 2023-10-05 PROCEDURE — 80053 COMPREHEN METABOLIC PANEL: CPT

## 2023-10-05 RX ORDER — CALCIUM CARBONATE 500 MG/1
500 TABLET, CHEWABLE ORAL ONCE
Status: COMPLETED | OUTPATIENT
Start: 2023-10-05 | End: 2023-10-06

## 2023-10-06 VITALS
DIASTOLIC BLOOD PRESSURE: 89 MMHG | SYSTOLIC BLOOD PRESSURE: 143 MMHG | HEART RATE: 82 BPM | BODY MASS INDEX: 29.33 KG/M2 | RESPIRATION RATE: 18 BRPM | HEIGHT: 69 IN | WEIGHT: 198 LBS | TEMPERATURE: 97.8 F

## 2023-10-06 LAB
ABO GROUP BLD: NORMAL
ALBUMIN SERPL BCP-MCNC: 3.7 G/DL (ref 3.5–5)
ALP SERPL-CCNC: 79 U/L (ref 34–104)
ALT SERPL W P-5'-P-CCNC: 7 U/L (ref 7–52)
ANION GAP SERPL CALCULATED.3IONS-SCNC: 9 MMOL/L
AST SERPL W P-5'-P-CCNC: 12 U/L (ref 13–39)
BASOPHILS # BLD AUTO: 0.07 THOUSANDS/ÂΜL (ref 0–0.1)
BASOPHILS NFR BLD AUTO: 1 % (ref 0–1)
BILIRUB SERPL-MCNC: 0.26 MG/DL (ref 0.2–1)
BLD GP AB SCN SERPL QL: NEGATIVE
BUN SERPL-MCNC: 10 MG/DL (ref 5–25)
CALCIUM SERPL-MCNC: 10 MG/DL (ref 8.4–10.2)
CHLORIDE SERPL-SCNC: 105 MMOL/L (ref 96–108)
CO2 SERPL-SCNC: 21 MMOL/L (ref 21–32)
CREAT SERPL-MCNC: 0.58 MG/DL (ref 0.6–1.3)
CREAT UR-MCNC: 45.2 MG/DL
EOSINOPHIL # BLD AUTO: 0.17 THOUSAND/ÂΜL (ref 0–0.61)
EOSINOPHIL NFR BLD AUTO: 1 % (ref 0–6)
ERYTHROCYTE [DISTWIDTH] IN BLOOD BY AUTOMATED COUNT: 14.1 % (ref 11.6–15.1)
GFR SERPL CREATININE-BSD FRML MDRD: 118 ML/MIN/1.73SQ M
GLUCOSE SERPL-MCNC: 96 MG/DL (ref 65–140)
HCT VFR BLD AUTO: 32.8 % (ref 34.8–46.1)
HGB BLD-MCNC: 10.8 G/DL (ref 11.5–15.4)
IMM GRANULOCYTES # BLD AUTO: 0.07 THOUSAND/UL (ref 0–0.2)
IMM GRANULOCYTES NFR BLD AUTO: 1 % (ref 0–2)
LYMPHOCYTES # BLD AUTO: 2.01 THOUSANDS/ÂΜL (ref 0.6–4.47)
LYMPHOCYTES NFR BLD AUTO: 16 % (ref 14–44)
MCH RBC QN AUTO: 28.7 PG (ref 26.8–34.3)
MCHC RBC AUTO-ENTMCNC: 32.9 G/DL (ref 31.4–37.4)
MCV RBC AUTO: 87 FL (ref 82–98)
MONOCYTES # BLD AUTO: 0.68 THOUSAND/ÂΜL (ref 0.17–1.22)
MONOCYTES NFR BLD AUTO: 6 % (ref 4–12)
NEUTROPHILS # BLD AUTO: 9.25 THOUSANDS/ÂΜL (ref 1.85–7.62)
NEUTS SEG NFR BLD AUTO: 75 % (ref 43–75)
NRBC BLD AUTO-RTO: 0 /100 WBCS
PLATELET # BLD AUTO: 254 THOUSANDS/UL (ref 149–390)
PMV BLD AUTO: 11.2 FL (ref 8.9–12.7)
POTASSIUM SERPL-SCNC: 3.1 MMOL/L (ref 3.5–5.3)
PROT SERPL-MCNC: 7.1 G/DL (ref 6.4–8.4)
PROT UR-MCNC: 11 MG/DL
PROT/CREAT UR: 0.24 MG/G{CREAT} (ref 0–0.1)
RBC # BLD AUTO: 3.76 MILLION/UL (ref 3.81–5.12)
RH BLD: POSITIVE
SODIUM SERPL-SCNC: 135 MMOL/L (ref 135–147)
SPECIMEN EXPIRATION DATE: NORMAL
TREPONEMA PALLIDUM IGG+IGM AB [PRESENCE] IN SERUM OR PLASMA BY IMMUNOASSAY: NORMAL
WBC # BLD AUTO: 12.25 THOUSAND/UL (ref 4.31–10.16)

## 2023-10-06 PROCEDURE — 99214 OFFICE O/P EST MOD 30 MIN: CPT | Performed by: OBSTETRICS & GYNECOLOGY

## 2023-10-06 PROCEDURE — 99214 OFFICE O/P EST MOD 30 MIN: CPT

## 2023-10-06 PROCEDURE — G0463 HOSPITAL OUTPT CLINIC VISIT: HCPCS

## 2023-10-06 RX ORDER — NIFEDIPINE 30 MG/1
60 TABLET, EXTENDED RELEASE ORAL DAILY
Qty: 60 TABLET | Refills: 0
Start: 2023-10-06

## 2023-10-06 RX ADMIN — CALCIUM CARBONATE (ANTACID) CHEW TAB 500 MG 500 MG: 500 CHEW TAB at 00:04

## 2023-10-06 NOTE — TELEPHONE ENCOUNTER
Regardin weeks pregnant / bp 140-90 / tingly feel  ----- Message from Mary Lou sent at 10/5/2023  9:10 PM EDT -----  "I'm 36 weeks pregnant and my BP is 140/90 and my feet are feeling tingly and I'm a little nauseous"

## 2023-10-06 NOTE — PROGRESS NOTES
L&D Triage Note - OB/GYN  Karina Maier 41 y.o. female MRN: [id number]  Unit/Bed#: LD TRIAGE  Encounter: [id number]      ASSESSMENT:    Karina Maier is a 41 y.o. Pilar Parmar at 35w6d that presents to triage for high blood pressure with nausea, dizziness, SOB, fatigue, and tingling in her hands and feet. Initial /98 and has decreased since. Preeclampsia labs wnl. Increased procardia from 30 mg daily to 60 mg daily. WANDA 11.7. Patient stable for discharge. PLAN:    1) Rule out preeclampsia   - Ordered CBC, CMP, Urine P:C ratio   - Initial /98, cycled every 15 min, went down to 137/86, currently 143/89  - Started Procardia XL 30 mg daily on , patient compliant and taking as prescribed   - Results: Plt 254, ALT 7, AST 12, Urine P:C 0.24   - Will increase Procardia XL 30 mg to 60 mg daily  - WANDA: 11.7  - Patient feeling improved and stable for discharge     2) Heartburn  - Ordered tums    3) Anemia   - H/H: 10.8/32.8   - can continue outpatient management as needed     4) Discharge from [facility] triage with  labor precautions   - Reviewed rupture of membranes, false vs true labor, decreased fetal movement, and vaginal bleeding  - Pt to call provider with any concerns and follow up at her next scheduled prenatal appointment. Dr Suleiman Bone will schedule with her office for an earlier appointment. - Continue routine prenatal care  - Case discussed with Dr. Hartley Spotted:    Karina Maier 41 y.o. Pilar Parmar at 35w6d with an Estimated Date of Delivery: 11/3/23. The patient reports that she had a high blood pressure reading at home of 143/91 earlier today. She reports that she rechecked her blood pressures after and it was elevating more, but she does not remember the number. When she woke up this morning, she felt fatigued, nauseous, and dizzy. She reports seeing floaters in her right eye only. She also reports some SOB and describes it as anxiety. She reports recent tingling in her hands and feet.  She denies headache, vomiting, fevers, burning with urination, or abdominal pain. She is accompanied by her partner who helps to provide a history. She has no other complaints or issues at this time.      Her current obstetrical history is significant for gestational hypertension on procardia 30 mg daily, breech presentation, AMA, small anterior fibroids     Her past obstetrical history is significant for history of preeclampsia    Contractions: absent  Leakage of fluid: absent  Vaginal Bleeding: absent  Fetal movement: present    OBJECTIVE:    Vitals:    10/05/23 2248   BP: 159/98   Pulse: 90   Resp: 18   Temp: 97.8 °F (36.6 °C)       ROS:  Constitutional: Negative besides anxiety  Respiratory: SOB, describes it as more of an anxious feeling, no respiratory distress  Cardiovascular: Negative    Gastrointestinal: Negative    General Physical Exam:  General: in no apparent distress, alert and oriented times 3  Cardiovascular: intact distals pulses Cor RRR  Lungs: non-labored breathing, clear and equal bilaterally   Abdomen: abdomen is soft without significant tenderness, masses, organomegaly or guarding; no CVA tenderness   Lower extremeties: nontender, b/l Margie's sign negative      Fetal monitoring:  FHT:  130 bpm/ Moderate 6 - 25 bpm / 15 x 15 accelerations present, no decelerations  [place]: irritable contractions    Cristel Grime, DO  OBGYN PGY-3  10/5/2023 11:39 PM

## 2023-10-06 NOTE — TELEPHONE ENCOUNTER
Reason for Disposition  • [1] Pregnant 20 or more weeks AND [3] Systolic BP  >= 048 OR Diastolic >= 90    Answer Assessment - Initial Assessment Questions  1. BLOOD PRESSURE: "What is the blood pressure?" "Did you take at least two measurements 5 minutes apart?"      140/90    2. ONSET: "When did you take your blood pressure?"      15 minutes ago     3. HOW: "How did you obtain the blood pressure?" (e.g., visiting nurse, automatic home BP monitor)      Automatic home BP monitor     4. HISTORY: "Do you have a history of high blood pressure?"      Yes    5. MEDICATIONS: "Are you taking any medications for blood pressure?" "Have you missed any doses recently?"      Yes, and no missed dosage     6. OTHER SYMPTOMS: "Do you have any symptoms?" (e.g., headache, chest pain, blurred vision, difficulty breathing, weakness)  Nausea, chest pain, tingling in fingers        7.  PREGNANCY: "Is there any chance you are pregnant?" "When was your last menstrual period?"      Yes, 35 weeks 6 days    Nifedipine 30 mg at 0700 this morning    Protocols used: BLOOD PRESSURE - HIGH-ADULT-

## 2023-10-07 LAB
C TRACH RRNA SPEC QL NAA+PROBE: NEGATIVE
GP B STREP DNA SPEC QL NAA+PROBE: POSITIVE
N GONORRHOEA RRNA SPEC QL NAA+PROBE: NEGATIVE

## 2023-10-09 ENCOUNTER — ANESTHESIA EVENT (INPATIENT)
Dept: ANESTHESIOLOGY | Facility: HOSPITAL | Age: 38
End: 2023-10-09
Payer: COMMERCIAL

## 2023-10-09 ENCOUNTER — HOSPITAL ENCOUNTER (INPATIENT)
Facility: HOSPITAL | Age: 38
LOS: 3 days | Discharge: HOME | End: 2023-10-12
Attending: OBSTETRICS & GYNECOLOGY | Admitting: OBSTETRICS & GYNECOLOGY
Payer: COMMERCIAL

## 2023-10-09 ENCOUNTER — ROUTINE PRENATAL (OUTPATIENT)
Dept: OBGYN CLINIC | Facility: CLINIC | Age: 38
End: 2023-10-09
Payer: COMMERCIAL

## 2023-10-09 ENCOUNTER — ANESTHESIA (INPATIENT)
Dept: ANESTHESIOLOGY | Facility: HOSPITAL | Age: 38
End: 2023-10-09
Payer: COMMERCIAL

## 2023-10-09 VITALS
DIASTOLIC BLOOD PRESSURE: 90 MMHG | HEART RATE: 103 BPM | OXYGEN SATURATION: 98 % | WEIGHT: 197.2 LBS | BODY MASS INDEX: 29.12 KG/M2 | SYSTOLIC BLOOD PRESSURE: 148 MMHG

## 2023-10-09 DIAGNOSIS — O13.3 GESTATIONAL HYPERTENSION, THIRD TRIMESTER: ICD-10-CM

## 2023-10-09 DIAGNOSIS — O14.93 PRE-ECLAMPSIA IN THIRD TRIMESTER: Primary | ICD-10-CM

## 2023-10-09 PROBLEM — E87.6 HYPOKALEMIA: Status: ACTIVE | Noted: 2023-10-09

## 2023-10-09 PROBLEM — Z3A.36 36 WEEKS GESTATION OF PREGNANCY: Status: ACTIVE | Noted: 2023-06-12

## 2023-10-09 PROBLEM — O14.90 PREECLAMPSIA: Status: ACTIVE | Noted: 2023-09-19

## 2023-10-09 LAB
ABO GROUP BLD: NORMAL
ALBUMIN SERPL BCP-MCNC: 3.6 G/DL (ref 3.5–5)
ALP SERPL-CCNC: 77 U/L (ref 34–104)
ALT SERPL W P-5'-P-CCNC: 7 U/L (ref 7–52)
ANION GAP SERPL CALCULATED.3IONS-SCNC: 9 MMOL/L
AST SERPL W P-5'-P-CCNC: 11 U/L (ref 13–39)
BILIRUB SERPL-MCNC: 0.25 MG/DL (ref 0.2–1)
BLD GP AB SCN SERPL QL: NEGATIVE
BUN SERPL-MCNC: 8 MG/DL (ref 5–25)
CALCIUM SERPL-MCNC: 9.8 MG/DL (ref 8.4–10.2)
CHLORIDE SERPL-SCNC: 105 MMOL/L (ref 96–108)
CO2 SERPL-SCNC: 22 MMOL/L (ref 21–32)
CREAT SERPL-MCNC: 0.63 MG/DL (ref 0.6–1.3)
CREAT UR-MCNC: 47.7 MG/DL
ERYTHROCYTE [DISTWIDTH] IN BLOOD BY AUTOMATED COUNT: 13.9 % (ref 11.6–15.1)
GFR SERPL CREATININE-BSD FRML MDRD: 114 ML/MIN/1.73SQ M
GLUCOSE SERPL-MCNC: 122 MG/DL (ref 65–140)
HCT VFR BLD AUTO: 31.1 % (ref 34.8–46.1)
HGB BLD-MCNC: 10.3 G/DL (ref 11.5–15.4)
MCH RBC QN AUTO: 28.8 PG (ref 26.8–34.3)
MCHC RBC AUTO-ENTMCNC: 33.1 G/DL (ref 31.4–37.4)
MCV RBC AUTO: 87 FL (ref 82–98)
PLATELET # BLD AUTO: 254 THOUSANDS/UL (ref 149–390)
PMV BLD AUTO: 11.3 FL (ref 8.9–12.7)
POTASSIUM SERPL-SCNC: 2.9 MMOL/L (ref 3.5–5.3)
PROT SERPL-MCNC: 6.8 G/DL (ref 6.4–8.4)
PROT UR-MCNC: 10 MG/DL
PROT/CREAT UR: 0.21 MG/G{CREAT} (ref 0–0.1)
RBC # BLD AUTO: 3.58 MILLION/UL (ref 3.81–5.12)
RH BLD: POSITIVE
SODIUM SERPL-SCNC: 136 MMOL/L (ref 135–147)
SPECIMEN EXPIRATION DATE: NORMAL
TREPONEMA PALLIDUM IGG+IGM AB [PRESENCE] IN SERUM OR PLASMA BY IMMUNOASSAY: NORMAL
WBC # BLD AUTO: 10.69 THOUSAND/UL (ref 4.31–10.16)

## 2023-10-09 PROCEDURE — NC001 PR NO CHARGE: Performed by: OBSTETRICS & GYNECOLOGY

## 2023-10-09 PROCEDURE — 86901 BLOOD TYPING SEROLOGIC RH(D): CPT

## 2023-10-09 PROCEDURE — 86900 BLOOD TYPING SEROLOGIC ABO: CPT

## 2023-10-09 PROCEDURE — 59025 FETAL NON-STRESS TEST: CPT | Performed by: OBSTETRICS & GYNECOLOGY

## 2023-10-09 PROCEDURE — G0463 HOSPITAL OUTPT CLINIC VISIT: HCPCS

## 2023-10-09 PROCEDURE — 82570 ASSAY OF URINE CREATININE: CPT

## 2023-10-09 PROCEDURE — 84156 ASSAY OF PROTEIN URINE: CPT

## 2023-10-09 PROCEDURE — 85027 COMPLETE CBC AUTOMATED: CPT

## 2023-10-09 PROCEDURE — 99213 OFFICE O/P EST LOW 20 MIN: CPT

## 2023-10-09 PROCEDURE — 86780 TREPONEMA PALLIDUM: CPT

## 2023-10-09 PROCEDURE — 80053 COMPREHEN METABOLIC PANEL: CPT

## 2023-10-09 PROCEDURE — 86850 RBC ANTIBODY SCREEN: CPT

## 2023-10-09 RX ORDER — OXYTOCIN/RINGER'S LACTATE 30/500 ML
1-30 PLASTIC BAG, INJECTION (ML) INTRAVENOUS
Status: DISCONTINUED | OUTPATIENT
Start: 2023-10-10 | End: 2023-10-10

## 2023-10-09 RX ORDER — POTASSIUM CHLORIDE 20 MEQ/1
40 TABLET, EXTENDED RELEASE ORAL ONCE
Status: COMPLETED | OUTPATIENT
Start: 2023-10-09 | End: 2023-10-09

## 2023-10-09 RX ORDER — SODIUM CHLORIDE, SODIUM LACTATE, POTASSIUM CHLORIDE, CALCIUM CHLORIDE 600; 310; 30; 20 MG/100ML; MG/100ML; MG/100ML; MG/100ML
50 INJECTION, SOLUTION INTRAVENOUS CONTINUOUS
Status: DISCONTINUED | OUTPATIENT
Start: 2023-10-09 | End: 2023-10-10

## 2023-10-09 RX ORDER — MAGNESIUM SULFATE HEPTAHYDRATE 40 MG/ML
1 INJECTION, SOLUTION INTRAVENOUS CONTINUOUS
Status: DISCONTINUED | OUTPATIENT
Start: 2023-10-09 | End: 2023-10-11

## 2023-10-09 RX ORDER — NIFEDIPINE 30 MG/1
60 TABLET, EXTENDED RELEASE ORAL DAILY
Status: DISCONTINUED | OUTPATIENT
Start: 2023-10-10 | End: 2023-10-10

## 2023-10-09 RX ORDER — MAGNESIUM SULFATE HEPTAHYDRATE 40 MG/ML
2 INJECTION, SOLUTION INTRAVENOUS ONCE
Status: COMPLETED | OUTPATIENT
Start: 2023-10-09 | End: 2023-10-09

## 2023-10-09 RX ORDER — MAGNESIUM SULFATE HEPTAHYDRATE 40 MG/ML
4 INJECTION, SOLUTION INTRAVENOUS ONCE
Status: COMPLETED | OUTPATIENT
Start: 2023-10-09 | End: 2023-10-09

## 2023-10-09 RX ORDER — CALCIUM GLUCONATE 94 MG/ML
1 INJECTION, SOLUTION INTRAVENOUS ONCE AS NEEDED
Status: DISCONTINUED | OUTPATIENT
Start: 2023-10-09 | End: 2023-10-12 | Stop reason: HOSPADM

## 2023-10-09 RX ORDER — CALCIUM CARBONATE 500 MG/1
1000 TABLET, CHEWABLE ORAL 3 TIMES DAILY PRN
Status: DISCONTINUED | OUTPATIENT
Start: 2023-10-09 | End: 2023-10-10

## 2023-10-09 RX ORDER — BETAMETHASONE SODIUM PHOSPHATE AND BETAMETHASONE ACETATE 3; 3 MG/ML; MG/ML
12 INJECTION, SUSPENSION INTRA-ARTICULAR; INTRALESIONAL; INTRAMUSCULAR; SOFT TISSUE EVERY 24 HOURS
Status: DISCONTINUED | OUTPATIENT
Start: 2023-10-09 | End: 2023-10-10

## 2023-10-09 RX ADMIN — SODIUM CHLORIDE 5 MILLION UNITS: 0.9 INJECTION, SOLUTION INTRAVENOUS at 18:33

## 2023-10-09 RX ADMIN — POTASSIUM CHLORIDE 40 MEQ: 1500 TABLET, EXTENDED RELEASE ORAL at 19:20

## 2023-10-09 RX ADMIN — SODIUM CHLORIDE, SODIUM LACTATE, POTASSIUM CHLORIDE, AND CALCIUM CHLORIDE 50 ML/HR: .6; .31; .03; .02 INJECTION, SOLUTION INTRAVENOUS at 18:45

## 2023-10-09 RX ADMIN — MAGNESIUM SULFATE HEPTAHYDRATE 4 G: 40 INJECTION, SOLUTION INTRAVENOUS at 18:45

## 2023-10-09 RX ADMIN — MAGNESIUM SULFATE IN WATER 2 G/HR: 20 INJECTION, SOLUTION INTRAVENOUS at 19:25

## 2023-10-09 RX ADMIN — PENICILLIN G POTASSIUM 2.5 MILLION UNITS: 20000000 INJECTION, POWDER, FOR SOLUTION INTRAVENOUS at 22:28

## 2023-10-09 RX ADMIN — Medication 50 MCG: at 20:00

## 2023-10-09 RX ADMIN — BETAMETHASONE SODIUM PHOSPHATE AND BETAMETHASONE ACETATE 12 MG: 3; 3 INJECTION, SUSPENSION INTRA-ARTICULAR; INTRALESIONAL; INTRAMUSCULAR at 18:24

## 2023-10-09 RX ADMIN — MAGNESIUM SULFATE HEPTAHYDRATE 2 G: 40 INJECTION, SOLUTION INTRAVENOUS at 19:04

## 2023-10-09 NOTE — ASSESSMENT & PLAN NOTE
Continue routine post partum care  Pain well controlled: tylenol/motrin scheduled  Lochia within normal limits: continue to monitor   OOB: as able, encourage ambulation  Passing flatus  Voiding spontaneously  Breastfeeding  Baby in: room  Dispo: anticipate d/c home pending blood pressure management

## 2023-10-09 NOTE — H&P
History and Physical - Obstetrics  Yaima Brice 41 y.o. female MRN: [id number]  Unit/Bed#: LD TRIAGE 1 Encounter: [id number]    ObGyn Provider:  Jess Hassan [facility]     ASSESSMENT AND PLAN:  Yaima Brice is a 41y.o. year-old  at  [address]: 1, admitted for IOL for preeclampsia with severe features. By issue:    * 36 weeks gestation of pregnancy  Assessment & Plan  Admit for IOL since currently cephalic  PreE labs, RPR, type and screen ordered  Penicillin ordered for GBS ppx  Analgesia at maternal request  Will administer late  betamethasone    Preeclampsia with severe features  Assessment & Plan  Severe feature = BP increasing despite higher doses of Procardia  Recommend delivery at this time, confirmed with MFM provider  Will start magnesium infusion and begin IOL  Currently asymptomatic    Hypokalemia  Assessment & Plan  K+ 2.9 on admission  Will replete with PO kdur 40 meq    History of pre-eclampsia in prior pregnancy, currently pregnant in third trimester  Assessment & Plan  PreE without SF diagnosed at term    Uterine fibroids affecting pregnancy  Assessment & Plan  Intramural anterior corpus 1.4 x 1.4 x 1.7cm  Subserosal anterior corpus region 2.5 x 1.4 x 2.5cm      The above assessment and plan was discussed with the admitting provider, Dr. Lidia Gandhi    Expected LOS: >2 Midnights  Admission: INPATIENT     SUBJECTIVE:    History of Present Illness:  Yaima Brice is a 41 y.o.  female at 41w4d, SOFIA 11/3/2023, by Ultrasound, Hospital Day: 1, who presents with elevated BP in the office and a nonreactive NST. She currently denies all symptoms of preE. She also denies contractions, leakage of fluid and vaginal bleeding. She has felt good fetal movement. Review of Systems   Constitutional: Negative. HENT: Negative. Eyes: Negative for visual disturbance. Respiratory: Negative. Cardiovascular: Negative. Gastrointestinal: Negative. Genitourinary: Negative. Musculoskeletal: Negative. Neurological: Negative. Negative for headaches. Hematological: Negative. Psychiatric/Behavioral: Negative. All other systems reviewed and are negative. Current Pregnancy Problems:  Problem   36 Weeks Gestation of Pregnancy   Preeclampsia with severe features   Hypokalemia   History of Pre-Eclampsia in Prior Pregnancy, Currently Pregnant in Third Trimester   Uterine Fibroids Affecting Pregnancy       Past Obstetric History:   Patient's last menstrual period was 2023 (approximate). OB History    Para Term  AB Living   2 1 1 0 0 1   SAB IAB Ectopic Multiple Live Births   0 0 0 0 1      # Outcome Date GA Lbr Michel/2nd Weight Sex Delivery Anes PTL Lv   2 Current            1 Term 19 39w1d / 01:20 3912 g (8 lb 10 oz) F Vag-Spont EPI  TIM      Complications: Gestational hypertension      Name: Jaquan Byrd (MEKHI)      Apgar1: 8  Apgar5: 9       Pregnancy Plan:  Pregnancy: Adame  Fetal sex: Male  Support person: Delia Atkinson     Delivery Plans  Planned delivery method: Vaginal  Planned delivery location: [facility] L&D  Planned anesthesia: Epidural  Acceptable blood products: All     Post-Delivery Plans  Feeding intentions: Breast Milk  Circumcision requested: Provider Performed    HISTORICAL INFORMATION:  Past Medical History:   Diagnosis Date   • Anxiety    • Shingles 2016   • Varicella     childhood and shingles     No past surgical history on file.       Family History   Problem Relation Age of Onset   • Other Mother         vulvar cancer   • Heart disease Father    • Hypertension Father    • No Known Problems Brother    • No Known Problems Brother    • Cancer Maternal Grandmother         leukemia   • Cancer Maternal Grandfather         lung   • Diabetes Maternal Grandfather         type 2   • Heart disease Paternal Grandmother    • No Known Problems Paternal Grandfather    • Diabetes Maternal Aunt         type 2   • Diabetes Maternal Uncle         type 2   • Heart disease Paternal Aunt        Allergies:   No Known Allergies    Current Medications:  Current Outpatient Medications   Medication Instructions   • calcium carbonate (TUMS) 500 mg chewable tablet 1 tablet, Oral, Daily   • famotidine (PEPCID) 20 mg, Oral, 2 times daily   • NIFEdipine (PROCARDIA XL) 60 mg, Oral, Daily   • ondansetron (ZOFRAN ODT) 8 mg, Oral, Every 8 hours PRN   • Prenatal MV-Min-Fe Fum-FA-DHA (PRENATAL+DHA) 28-0.975 & 200 MG MISC 1 tablet, Oral, Daily       OBJECTIVE:  Vitals:  Patient Vitals for the past 24 hrs:   BP Temp Temp src Pulse Resp SpO2 Height Weight   10/09/23 1707 133/88 -- -- 95 -- -- -- --   10/09/23 1700 144/94 -- -- 94 -- -- -- --   10/09/23 1649 129/86 -- -- 100 -- -- -- --   10/09/23 1637 147/85 -- -- 99 -- -- -- --   10/09/23 1628 136/86 -- -- 95 -- -- -- --   10/09/23 1618 138/87 -- -- 96 -- -- -- --   10/09/23 1607 -- 98.3 °F (36.8 °C) Tympanic 98 14 96 % 5' 9" (1.753 m) 89.4 kg (197 lb)     Body mass index is 29.09 kg/m². Invasive Devices     None                 Physical Exam   GEN: The patient was alert and oriented x3, pleasant well-appearing female in no acute distress.    CV: Regular rate  PULM: Non-labored respirations  MSK: Normal gait  Skin: Warm, dry  Neuro: No focal deficits  Psych: Normal affect and judgement, cooperative      Cervical Exam:    Cervical Dilation: 1  Cervical Effacement: 30  Cervical Consistency: Firm  Fetal Station: Ballotable  Presentation: Vertex  Method: Manual  OB Examiner: Victorina Fischer      Fetal Heart Tracing:  FHT:   Baseline Rate: 140 bpm  Variability: Moderate 6-25 bpm  Accelerations: 15 x 15 or greater, At variable times  Decelerations: None    [place]:  Contraction Frequency (minutes): rare      Prenatal Labs:  Lab Results   Component Value Date/Time    ABO A 10/09/2023 04:43 PM    RH Positive 10/09/2023 04:43 PM    RH Positive 04/27/2023 12:26 PM    HGB 10.3 (L) 10/09/2023 04:43 PM    HCT 31.1 (L) 10/09/2023 04:43 PM     10/09/2023 04:43 PM    HEPBSAG neg 02/20/2019 12:00 AM    VARICELLAIGG 559 04/27/2023 12:26 PM    [id number] 121 08/15/2023 11:12 AM    VMM5HFZF84DM 114 06/12/2019 11:54 AM    STREPGRPB Positive (A) 10/04/2023 04:00 PM          Marianna Orona MD  OB/GYN, PGY-3  10/9/2023  6:02 PM

## 2023-10-09 NOTE — ASSESSMENT & PLAN NOTE
Severe feature = BP increasing despite higher doses of Procardia  S/p 24 hours Magnesium  Currently on Procardia 60mg Q24 hours  Bps WNL last 24 hours:      - Systolic (45TXF), IVON:537 , Min:129 , ZWB:661      - Diastolic (61WOX), IIM:09, Min:74, Max:91  Currently asymptomatic

## 2023-10-09 NOTE — PROGRESS NOTES
NST started at 239, stopped at 325  BP today 158/92- she denies any symptoms.  She is taking 60 or Procardia daily  Repeat /90  Reviewed with Theron Bob PA-C  NST Non-reactive- will send to L&D for extended monitoring

## 2023-10-10 LAB
ALBUMIN SERPL BCP-MCNC: 3.5 G/DL (ref 3.5–5)
ALBUMIN SERPL BCP-MCNC: 3.8 G/DL (ref 3.5–5)
ALP SERPL-CCNC: 81 U/L (ref 34–104)
ALP SERPL-CCNC: 89 U/L (ref 34–104)
ALT SERPL W P-5'-P-CCNC: 7 U/L (ref 7–52)
ALT SERPL W P-5'-P-CCNC: 8 U/L (ref 7–52)
ANION GAP SERPL CALCULATED.3IONS-SCNC: 10 MMOL/L
ANION GAP SERPL CALCULATED.3IONS-SCNC: 12 MMOL/L
AST SERPL W P-5'-P-CCNC: 10 U/L (ref 13–39)
AST SERPL W P-5'-P-CCNC: 12 U/L (ref 13–39)
BASE EXCESS BLDCOA CALC-SCNC: -7.8 MMOL/L (ref 3–11)
BASE EXCESS BLDCOV CALC-SCNC: -5.6 MMOL/L (ref 1–9)
BILIRUB SERPL-MCNC: 0.25 MG/DL (ref 0.2–1)
BILIRUB SERPL-MCNC: 0.27 MG/DL (ref 0.2–1)
BUN SERPL-MCNC: 7 MG/DL (ref 5–25)
BUN SERPL-MCNC: 7 MG/DL (ref 5–25)
CALCIUM SERPL-MCNC: 7.9 MG/DL (ref 8.4–10.2)
CALCIUM SERPL-MCNC: 9.1 MG/DL (ref 8.4–10.2)
CHLORIDE SERPL-SCNC: 105 MMOL/L (ref 96–108)
CHLORIDE SERPL-SCNC: 105 MMOL/L (ref 96–108)
CO2 SERPL-SCNC: 19 MMOL/L (ref 21–32)
CO2 SERPL-SCNC: 20 MMOL/L (ref 21–32)
CREAT SERPL-MCNC: 0.65 MG/DL (ref 0.6–1.3)
CREAT SERPL-MCNC: 0.72 MG/DL (ref 0.6–1.3)
ERYTHROCYTE [DISTWIDTH] IN BLOOD BY AUTOMATED COUNT: 14.1 % (ref 11.6–15.1)
ERYTHROCYTE [DISTWIDTH] IN BLOOD BY AUTOMATED COUNT: 14.4 % (ref 11.6–15.1)
GFR SERPL CREATININE-BSD FRML MDRD: 107 ML/MIN/1.73SQ M
GFR SERPL CREATININE-BSD FRML MDRD: 113 ML/MIN/1.73SQ M
GLUCOSE SERPL-MCNC: 115 MG/DL (ref 65–140)
GLUCOSE SERPL-MCNC: 179 MG/DL (ref 65–140)
HCO3 BLDCOA-SCNC: 21.4 MMOL/L (ref 17.3–27.3)
HCO3 BLDCOV-SCNC: 20.4 MMOL/L (ref 12.2–28.6)
HCT VFR BLD AUTO: 31.9 % (ref 34.8–46.1)
HCT VFR BLD AUTO: 35.3 % (ref 34.8–46.1)
HGB BLD-MCNC: 10.6 G/DL (ref 11.5–15.4)
HGB BLD-MCNC: 11.2 G/DL (ref 11.5–15.4)
MAGNESIUM SERPL-MCNC: 4.2 MG/DL (ref 1.9–2.7)
MAGNESIUM SERPL-MCNC: 7.5 MG/DL (ref 1.9–2.7)
MCH RBC QN AUTO: 28.4 PG (ref 26.8–34.3)
MCH RBC QN AUTO: 29.5 PG (ref 26.8–34.3)
MCHC RBC AUTO-ENTMCNC: 31.7 G/DL (ref 31.4–37.4)
MCHC RBC AUTO-ENTMCNC: 33.2 G/DL (ref 31.4–37.4)
MCV RBC AUTO: 89 FL (ref 82–98)
MCV RBC AUTO: 90 FL (ref 82–98)
O2 CT VFR BLDCOA CALC: 8 ML/DL
OXYHGB MFR BLDCOA: 33 %
OXYHGB MFR BLDCOV: 57 %
PCO2 BLDCOA: 57.5 MM[HG] (ref 30–60)
PCO2 BLDCOV: 41.7 MM HG (ref 27–43)
PH BLDCOA: 7.19 [PH] (ref 7.23–7.43)
PH BLDCOV: 7.31 [PH] (ref 7.19–7.49)
PLATELET # BLD AUTO: 268 THOUSANDS/UL (ref 149–390)
PLATELET # BLD AUTO: 284 THOUSANDS/UL (ref 149–390)
PMV BLD AUTO: 10.9 FL (ref 8.9–12.7)
PMV BLD AUTO: 11.4 FL (ref 8.9–12.7)
PO2 BLDCOA: 19.3 MM HG (ref 5–25)
PO2 BLDCOV: 25.3 MM HG (ref 15–45)
POTASSIUM SERPL-SCNC: 3.4 MMOL/L (ref 3.5–5.3)
POTASSIUM SERPL-SCNC: 3.6 MMOL/L (ref 3.5–5.3)
PROT SERPL-MCNC: 6.6 G/DL (ref 6.4–8.4)
PROT SERPL-MCNC: 7.2 G/DL (ref 6.4–8.4)
RBC # BLD AUTO: 3.59 MILLION/UL (ref 3.81–5.12)
RBC # BLD AUTO: 3.94 MILLION/UL (ref 3.81–5.12)
SAO2 % BLDCOV: 13.4 ML/DL
SODIUM SERPL-SCNC: 135 MMOL/L (ref 135–147)
SODIUM SERPL-SCNC: 136 MMOL/L (ref 135–147)
WBC # BLD AUTO: 12.52 THOUSAND/UL (ref 4.31–10.16)
WBC # BLD AUTO: 14.08 THOUSAND/UL (ref 4.31–10.16)

## 2023-10-10 PROCEDURE — 0HQ9XZZ REPAIR PERINEUM SKIN, EXTERNAL APPROACH: ICD-10-PCS | Performed by: OBSTETRICS & GYNECOLOGY

## 2023-10-10 PROCEDURE — 10H07YZ INSERTION OF OTHER DEVICE INTO PRODUCTS OF CONCEPTION, VIA NATURAL OR ARTIFICIAL OPENING: ICD-10-PCS | Performed by: OBSTETRICS & GYNECOLOGY

## 2023-10-10 PROCEDURE — 82805 BLOOD GASES W/O2 SATURATION: CPT | Performed by: OBSTETRICS & GYNECOLOGY

## 2023-10-10 PROCEDURE — 80053 COMPREHEN METABOLIC PANEL: CPT

## 2023-10-10 PROCEDURE — 83735 ASSAY OF MAGNESIUM: CPT

## 2023-10-10 PROCEDURE — 85027 COMPLETE CBC AUTOMATED: CPT

## 2023-10-10 PROCEDURE — 3E033VJ INTRODUCTION OF OTHER HORMONE INTO PERIPHERAL VEIN, PERCUTANEOUS APPROACH: ICD-10-PCS | Performed by: OBSTETRICS & GYNECOLOGY

## 2023-10-10 PROCEDURE — 4A1HXCZ MONITORING OF PRODUCTS OF CONCEPTION, CARDIAC RATE, EXTERNAL APPROACH: ICD-10-PCS | Performed by: OBSTETRICS & GYNECOLOGY

## 2023-10-10 PROCEDURE — 3E0DXGC INTRODUCTION OF OTHER THERAPEUTIC SUBSTANCE INTO MOUTH AND PHARYNX, EXTERNAL APPROACH: ICD-10-PCS | Performed by: OBSTETRICS & GYNECOLOGY

## 2023-10-10 PROCEDURE — NC001 PR NO CHARGE: Performed by: OBSTETRICS & GYNECOLOGY

## 2023-10-10 PROCEDURE — 4A1H7CZ MONITORING OF PRODUCTS OF CONCEPTION, CARDIAC RATE, VIA NATURAL OR ARTIFICIAL OPENING: ICD-10-PCS | Performed by: OBSTETRICS & GYNECOLOGY

## 2023-10-10 PROCEDURE — 59400 OBSTETRICAL CARE: CPT | Performed by: OBSTETRICS & GYNECOLOGY

## 2023-10-10 PROCEDURE — 88307 TISSUE EXAM BY PATHOLOGIST: CPT | Performed by: PATHOLOGY

## 2023-10-10 PROCEDURE — 10907ZC DRAINAGE OF AMNIOTIC FLUID, THERAPEUTIC FROM PRODUCTS OF CONCEPTION, VIA NATURAL OR ARTIFICIAL OPENING: ICD-10-PCS | Performed by: OBSTETRICS & GYNECOLOGY

## 2023-10-10 RX ORDER — LIDOCAINE HYDROCHLORIDE AND EPINEPHRINE 15; 5 MG/ML; UG/ML
INJECTION, SOLUTION EPIDURAL AS NEEDED
Status: DISCONTINUED | OUTPATIENT
Start: 2023-10-10 | End: 2023-10-10 | Stop reason: HOSPADM

## 2023-10-10 RX ORDER — METOCLOPRAMIDE HYDROCHLORIDE 5 MG/ML
10 INJECTION INTRAMUSCULAR; INTRAVENOUS EVERY 6 HOURS PRN
Status: DISCONTINUED | OUTPATIENT
Start: 2023-10-10 | End: 2023-10-10

## 2023-10-10 RX ORDER — OXYTOCIN/RINGER'S LACTATE 30/500 ML
250 PLASTIC BAG, INJECTION (ML) INTRAVENOUS ONCE
Status: COMPLETED | OUTPATIENT
Start: 2023-10-10 | End: 2023-10-10

## 2023-10-10 RX ORDER — OXYTOCIN/RINGER'S LACTATE 30/500 ML
62.5 PLASTIC BAG, INJECTION (ML) INTRAVENOUS CONTINUOUS
Status: DISPENSED | OUTPATIENT
Start: 2023-10-10 | End: 2023-10-11

## 2023-10-10 RX ORDER — CALCIUM CARBONATE 500 MG/1
1000 TABLET, CHEWABLE ORAL DAILY PRN
Status: DISCONTINUED | OUTPATIENT
Start: 2023-10-10 | End: 2023-10-12 | Stop reason: HOSPADM

## 2023-10-10 RX ORDER — ONDANSETRON 2 MG/ML
4 INJECTION INTRAMUSCULAR; INTRAVENOUS EVERY 8 HOURS PRN
Status: DISCONTINUED | OUTPATIENT
Start: 2023-10-10 | End: 2023-10-12 | Stop reason: HOSPADM

## 2023-10-10 RX ORDER — ONDANSETRON 2 MG/ML
4 INJECTION INTRAMUSCULAR; INTRAVENOUS EVERY 4 HOURS PRN
Status: DISCONTINUED | OUTPATIENT
Start: 2023-10-10 | End: 2023-10-10

## 2023-10-10 RX ORDER — NIFEDIPINE 30 MG/1
60 TABLET, EXTENDED RELEASE ORAL DAILY
Status: DISCONTINUED | OUTPATIENT
Start: 2023-10-10 | End: 2023-10-11

## 2023-10-10 RX ORDER — FAMOTIDINE 10 MG/ML
20 INJECTION, SOLUTION INTRAVENOUS 2 TIMES DAILY PRN
Status: DISCONTINUED | OUTPATIENT
Start: 2023-10-10 | End: 2023-10-10

## 2023-10-10 RX ORDER — DIAPER,BRIEF,INFANT-TODD,DISP
1 EACH MISCELLANEOUS DAILY PRN
Status: DISCONTINUED | OUTPATIENT
Start: 2023-10-10 | End: 2023-10-12 | Stop reason: HOSPADM

## 2023-10-10 RX ORDER — DIPHENHYDRAMINE HCL 25 MG
25 TABLET ORAL EVERY 6 HOURS PRN
Status: DISCONTINUED | OUTPATIENT
Start: 2023-10-10 | End: 2023-10-12 | Stop reason: HOSPADM

## 2023-10-10 RX ORDER — DOCUSATE SODIUM 100 MG/1
100 CAPSULE, LIQUID FILLED ORAL 2 TIMES DAILY
Status: DISCONTINUED | OUTPATIENT
Start: 2023-10-10 | End: 2023-10-12 | Stop reason: HOSPADM

## 2023-10-10 RX ORDER — IBUPROFEN 600 MG/1
600 TABLET ORAL EVERY 6 HOURS
Status: DISCONTINUED | OUTPATIENT
Start: 2023-10-10 | End: 2023-10-12 | Stop reason: HOSPADM

## 2023-10-10 RX ORDER — PHENYLEPHRINE HCL IN 0.9% NACL 1 MG/10 ML
SYRINGE (ML) INTRAVENOUS AS NEEDED
Status: DISCONTINUED | OUTPATIENT
Start: 2023-10-10 | End: 2023-10-10 | Stop reason: HOSPADM

## 2023-10-10 RX ORDER — ACETAMINOPHEN 325 MG/1
650 TABLET ORAL EVERY 6 HOURS PRN
Status: DISCONTINUED | OUTPATIENT
Start: 2023-10-10 | End: 2023-10-10

## 2023-10-10 RX ORDER — TRANEXAMIC ACID 10 MG/ML
1000 INJECTION, SOLUTION INTRAVENOUS ONCE
Status: COMPLETED | OUTPATIENT
Start: 2023-10-10 | End: 2023-10-10

## 2023-10-10 RX ORDER — ACETAMINOPHEN 325 MG/1
650 TABLET ORAL EVERY 4 HOURS PRN
Status: DISCONTINUED | OUTPATIENT
Start: 2023-10-10 | End: 2023-10-12 | Stop reason: HOSPADM

## 2023-10-10 RX ADMIN — IBUPROFEN 600 MG: 600 TABLET ORAL at 23:57

## 2023-10-10 RX ADMIN — Medication 62.5 MILLI-UNITS/MIN: at 16:05

## 2023-10-10 RX ADMIN — IBUPROFEN 600 MG: 600 TABLET ORAL at 17:17

## 2023-10-10 RX ADMIN — Medication 200 MCG: at 08:30

## 2023-10-10 RX ADMIN — SODIUM CHLORIDE, SODIUM LACTATE, POTASSIUM CHLORIDE, AND CALCIUM CHLORIDE 999 ML/HR: .6; .31; .03; .02 INJECTION, SOLUTION INTRAVENOUS at 07:54

## 2023-10-10 RX ADMIN — DOCUSATE SODIUM 100 MG: 100 CAPSULE, LIQUID FILLED ORAL at 17:17

## 2023-10-10 RX ADMIN — Medication 250 MILLI-UNITS/MIN: at 15:30

## 2023-10-10 RX ADMIN — FAMOTIDINE 20 MG: 10 INJECTION INTRAVENOUS at 02:20

## 2023-10-10 RX ADMIN — FAMOTIDINE 20 MG: 10 INJECTION INTRAVENOUS at 09:37

## 2023-10-10 RX ADMIN — ROPIVACAINE HYDROCHLORIDE: 2 INJECTION, SOLUTION EPIDURAL; INFILTRATION at 08:25

## 2023-10-10 RX ADMIN — METOCLOPRAMIDE 10 MG: 5 INJECTION, SOLUTION INTRAMUSCULAR; INTRAVENOUS at 14:43

## 2023-10-10 RX ADMIN — PENICILLIN G POTASSIUM 2.5 MILLION UNITS: 20000000 INJECTION, POWDER, FOR SOLUTION INTRAVENOUS at 14:17

## 2023-10-10 RX ADMIN — ROPIVACAINE HYDROCHLORIDE: 2 INJECTION, SOLUTION EPIDURAL; INFILTRATION at 14:17

## 2023-10-10 RX ADMIN — ONDANSETRON 4 MG: 2 INJECTION INTRAMUSCULAR; INTRAVENOUS at 08:24

## 2023-10-10 RX ADMIN — LIDOCAINE HYDROCHLORIDE AND EPINEPHRINE 3 ML: 15; 5 INJECTION, SOLUTION EPIDURAL at 08:09

## 2023-10-10 RX ADMIN — CALCIUM CARBONATE (ANTACID) CHEW TAB 500 MG 1000 MG: 500 CHEW TAB at 12:16

## 2023-10-10 RX ADMIN — PENICILLIN G POTASSIUM 2.5 MILLION UNITS: 20000000 INJECTION, POWDER, FOR SOLUTION INTRAVENOUS at 02:36

## 2023-10-10 RX ADMIN — Medication 2 MILLI-UNITS/MIN: at 01:57

## 2023-10-10 RX ADMIN — TRANEXAMIC ACID 1000 MG: 10 INJECTION, SOLUTION INTRAVENOUS at 15:39

## 2023-10-10 RX ADMIN — BENZOCAINE AND LEVOMENTHOL 1 APPLICATION: 200; 5 SPRAY TOPICAL at 17:17

## 2023-10-10 RX ADMIN — CALCIUM CARBONATE (ANTACID) CHEW TAB 500 MG 1000 MG: 500 CHEW TAB at 00:09

## 2023-10-10 RX ADMIN — ONDANSETRON 4 MG: 2 INJECTION INTRAMUSCULAR; INTRAVENOUS at 14:13

## 2023-10-10 RX ADMIN — PENICILLIN G POTASSIUM 2.5 MILLION UNITS: 20000000 INJECTION, POWDER, FOR SOLUTION INTRAVENOUS at 10:36

## 2023-10-10 RX ADMIN — ACETAMINOPHEN 650 MG: 325 TABLET, FILM COATED ORAL at 02:20

## 2023-10-10 RX ADMIN — PENICILLIN G POTASSIUM 2.5 MILLION UNITS: 20000000 INJECTION, POWDER, FOR SOLUTION INTRAVENOUS at 06:09

## 2023-10-10 RX ADMIN — MAGNESIUM SULFATE IN WATER 2 G/HR: 20 INJECTION, SOLUTION INTRAVENOUS at 05:36

## 2023-10-10 RX ADMIN — NIFEDIPINE 60 MG: 30 TABLET, EXTENDED RELEASE ORAL at 23:57

## 2023-10-10 NOTE — OB LABOR/OXYTOCIN SAFETY PROGRESS
Oxytocin Safety Progress Check Note - Tod Rivera 41 y.o. female MRN: [id number]    Unit/Bed#: -01 Encounter: [id number]    Dose (tootie-units/min) Oxytocin: 28 tootie-units/min  Contraction Frequency (minutes): 3  Contraction Quality: Moderate  Tachysystole: No   Cervical Dilation: 8        Cervical Effacement: 80  Fetal Station: -1  Baseline Rate: 125 bpm  Fetal Heart Rate: 110 BPM  FHR Category: 2               Vital Signs:   Vitals:    10/10/23 1500   BP: 121/71   Pulse: 101   Resp: 18   Temp: 98.2 °F (36.8 °C)   SpO2:        Notes/comments: Continue repositioning. Likely OP, peanut ball to assist with rotation.         Ki Contreras MD 10/10/2023 3:08 PM

## 2023-10-10 NOTE — OB LABOR/OXYTOCIN SAFETY PROGRESS
Oxytocin Safety Progress Check Note - Melba Vera 41 y.o. female MRN: [id number]    Unit/Bed#: -01 Encounter: [id number]    Dose (tootie-units/min) Oxytocin: 18 tootie-units/min  Contraction Frequency (minutes): 3  Contraction Quality: Mild  Tachysystole: No   Cervical Dilation: 5        Cervical Effacement: 60  Fetal Station: -1  Baseline Rate: 120 bpm  Fetal Heart Rate: 120 BPM  FHR Category: 1               Vital Signs:   Vitals:    10/10/23 1100   BP: 111/64   Pulse: 94   Resp: 18   Temp: 98 °F (36.7 °C)   SpO2:        Patient comfortable with epidural. SVE now 5/60/-1. FHT cat 1, heriberto approx q3 minutes. Pitocin @18, will continue to titrate as tolerated. Will d/w Dr. Adeel Oliver.      Masoud Cameron MD 10/10/2023 11:16 AM

## 2023-10-10 NOTE — DISCHARGE SUMMARY
Discharge Summary - OB/GYN   Preethi Wyman 41 y.o. female MRN: [id number]  Unit/Bed#: -01 Encounter: [id number]      Admission Date: 10/9/2023     Discharge Date: 10/12/23    Admitting Diagnosis:   1. Pregnancy at 36w4d  2. GERD  3. AMA  4. Pre-eclampsia with severe features  5. Hypokalemia    Discharge Diagnosis:   Same, delivered    Procedures: spontaneous vaginal delivery    Delivering Attending: Iwona Thomas, DO  Discharge Attending: Annie Rocha MD    Hospital Course:     Preethi Wyman is a 41 y.o.  female at 40w2d who was admitted to L&D for induction of labor for pre-eclampsia with severe features. She was started on magnesium 2g/hr, which was decreased to 1g/hr by patient request. She received cytotec followed by pitocin. She had AROM for clear fluids. She progressed to complete at 1521, pushed, and delivered a healthy  at 1. She delivered a viable male  on 10/10/23 at 1. Weight 6lbs 2.8oz via spontaneous vaginal delivery. Apgars were 9 (1 min) and 9 (5 min).  was transferred to  nursery. Patient tolerated the procedure well and was transferred to recovery in stable condition. Magnesium was held after delivery for lower segment atony and then restarted later the same evening of delivery. It was ultimately discontinued on the morning of postpartum day 1 after completion of adequate treatment. Her post-partum course was complicated by continuously elevated blood pressures, none requiring acute treatment. She was started on Procardia XL 30 mg qHs. Her post-partum pain was well controlled with oral analgesics. On day of discharge, she was ambulating and able to reasonably perform all ADLs. She was voiding and had appropriate bowel function. Pain was well controlled. She was discharged home on post-partum day #2 without complications. She was prescribed to continue the Procardia XL 30 upon discharge.  Patient was instructed to follow up with her OB as an outpatient and was given appropriate warnings to call provider if she develops signs of infection or uncontrolled pain. Complications: none apparent    Condition at discharge: good     Discharge instructions/Information to patient and family:   See after visit summary for information provided to patient and family. Provisions for Follow-Up Care:  See after visit summary for information related to follow-up care and any pertinent home health orders. Disposition: Home    Planned Readmission: No    Discharge Medications: For a complete list of the patient's medications, please refer to her med rec.     Dallas Cabrera MD  PGY-1 OBGYN

## 2023-10-10 NOTE — L&D DELIVERY NOTE
DELIVERY NOTE  Meagan Dawn 41 y.o. female MRN: [id number]  Unit/Bed#:  201-01 Encounter: [id number]    Obstetrician:    Dr. Alfreda Diaz,     Assistant:   Dr. Chester Wilkinson MD    Pre-Delivery Diagnosis:   Patient Active Problem List   Diagnosis   • 36 weeks gestation of pregnancy   • Gastroesophageal reflux in pregnancy   • Multigravida of advanced maternal age in second trimester   • Uterine fibroids affecting pregnancy   • AMA (advanced maternal age) multigravida 33+, third trimester   • History of pre-eclampsia in prior pregnancy, currently pregnant in third trimester   • Preeclampsia with severe features   • Hypokalemia       Post-Delivery Diagnosis:   Same as above - Delivered  1st degree laceration    Procedure:  Spontaneous vaginal delivery  Repair 1st degree spontaneous laceration      Anesthesia:  epidural    Specimens:   Cord blood obtained   Placenta; normal appearing, central insertion, intact   Arterial and venous blood gases (below)     Gases:  Umbilical Cord Venous Blood Gas:      Umbilical Cord Arterial Blood Gas:        Quantitative Blood Loss:   376 mL           Complications:    None    Brief Description of Labor Course:  Meagan Dawn is a 41 y.o.  female at 40w2d who was admitted to L&D for induction of labor for pre-eclampsia with severe features. She was started on magnesium 2g/hr, which was decreased to 1g/hr by patient request. She received cytotec followed by pitocin. She had AROM for clear fluids. She progressed to complete at 1521, pushed, and delivered a healthy  at 1. Description of Delivery:   With  the assistance of maternal expulsive forces, the fetal vertex delivered spontaneously. The anterior left shoulder was delivered atraumatically with gentle downward traction. The contralateral arm was delivered with gentle upward traction and maternal expulsive forces.  The remainder of the fetus delivered spontaneously at 1, resulting in a viable male . Upon delivery, the infant was placed on the mothers abdomen and the cord was doubly clamped and cut after 30 seconds. The  was noted to have good tone and cry spontaneously. There was no evidence of injury. The  was passed off to  staff for evaluation. Umbilical cord blood and umbilical artery and venous gases were collected and sent to the lab. An intact placenta was delivered spontaneously at 1531 using fundal massage and gentle cord traction and was noted to have a centrally-inserted 3-vessel cord. Active management of the third stage of labor was undertaken with IV pitocin at 250 milliunits/min. Inspection of the perineum, vagina, labia, cervix, and urethra revealed a 1st degree laceration. Bleeding was noted to be under control. A bimanual exam was performed and there was lower segment atony noted. Several clots were removed while uterine massage was performed. Pitocin was opened wide and TXA was given, with noted improved tone.  Outcome:  Living  with APGARS 9 (1 min) and 9 (5 min).  weight: pending    Perineal Inspection/Laceration Repair  Inspection of the perineum, vagina, labia, cervix, and urethra revealed a 1st degree laceration, which was repaired with 2 interrupted sutures using 4-0 Vicryl rapide. Patient was comfortable with epidural at that time. The laceration showed good tissue reapproximation and hemostasis. At the conclusion of the delivery, all needle, sponge, and instrument counts were noted to be correct. Patient tolerated the procedure well and was allowed to recover in labor and delivery room with family and  before being transferred to the post-partum floor. Conclusion:  Mother and baby are currently recovering nicely in stable condition. Attending Supervision:   Dr. Araseli Prasad DO was present for the entire procedure.     Mack Hogan MD   OB/GYN PGY-2  10/10/2023 4:04 PM

## 2023-10-10 NOTE — PLAN OF CARE
Problem: ANTEPARTUM  Goal: Maintain pregnancy as long as maternal and/or fetal condition is stable  Description: INTERVENTIONS:  - Maternal surveillance  - Fetal surveillance  - Monitor uterine activity  - Medications as ordered  - Bedrest  Outcome: Progressing     Problem: BIRTH - VAGINAL/ SECTION  Goal: Fetal and maternal status remain reassuring during the birth process  Description: INTERVENTIONS:  - Monitor vital signs  - Monitor fetal heart rate  - Monitor uterine activity  - Monitor labor progression (vaginal delivery)  - DVT prophylaxis  - Antibiotic prophylaxis  Outcome: Progressing  Goal: Emotionally satisfying birthing experience for mother/fetus  Description: Interventions:  - Assess, plan, implement and evaluate the nursing care given to the patient in labor  - Advocate the philosophy that each childbirth experience is a unique experience and support the family's chosen level of involvement and control during the labor process   - Actively participate in both the patient's and family's teaching of the birth process  - Consider cultural, Jainism and age-specific factors and plan care for the patient in labor  Outcome: Progressing     Problem: POSTPARTUM  Goal: Experiences normal postpartum course  Description: INTERVENTIONS:  - Monitor maternal vital signs  - Assess uterine involution and lochia  Outcome: Progressing  Goal: Appropriate maternal -  bonding  Description: INTERVENTIONS:  - Identify family support  - Assess for appropriate maternal/infant bonding   -Encourage maternal/infant bonding opportunities  - Referral to  or  as needed  Outcome: Progressing  Goal: Establishment of infant feeding pattern  Description: INTERVENTIONS:  - Assess breast/bottle feeding  - Refer to lactation as needed  Outcome: Progressing  Goal: Incision(s), wounds(s) or drain site(s) healing without S/S of infection  Description: INTERVENTIONS  - Assess and document dressing, incision, wound bed, drain sites and surrounding tissue  - Provide patient and family education  - Perform skin care/dressing changes every   Outcome: Progressing     Problem: PAIN - ADULT  Goal: Verbalizes/displays adequate comfort level or baseline comfort level  Description: Interventions:  - Encourage patient to monitor pain and request assistance  - Assess pain using appropriate pain scale  - Administer analgesics based on type and severity of pain and evaluate response  - Implement non-pharmacological measures as appropriate and evaluate response  - Consider cultural and social influences on pain and pain management  - Notify physician/advanced practitioner if interventions unsuccessful or patient reports new pain  Outcome: Progressing     Problem: INFECTION - ADULT  Goal: Absence or prevention of progression during hospitalization  Description: INTERVENTIONS:  - Assess and monitor for signs and symptoms of infection  - Monitor lab/diagnostic results  - Monitor all insertion sites, i.e. indwelling lines, tubes, and drains  - Monitor endotracheal if appropriate and nasal secretions for changes in amount and color  - [place] appropriate cooling/warming therapies per order  - Administer medications as ordered  - Instruct and encourage patient and family to use good hand hygiene technique  - Identify and instruct in appropriate isolation precautions for identified infection/condition  Outcome: Progressing  Goal: Absence of fever/infection during neutropenic period  Description: INTERVENTIONS:  - Monitor WBC    Outcome: Progressing     Problem: SAFETY ADULT  Goal: Patient will remain free of falls  Description: INTERVENTIONS:  - Educate patient/family on patient safety including physical limitations  - Instruct patient to call for assistance with activity   - Consult OT/PT to assist with strengthening/mobility   - Keep Call bell within reach  - Keep bed low and locked with side rails adjusted as appropriate  - Keep care items and personal belongings within reach  - Initiate and maintain comfort rounds  - Make Fall Risk Sign visible to staff  - Offer Toileting every  Hours, in advance of need  - Initiate/Maintain alarm  - Obtain necessary fall risk management equipment:   - Apply yellow socks and bracelet for high fall risk patients  - Consider moving patient to room near nurses station  Outcome: Progressing  Goal: Maintain or return to baseline ADL function  Description: INTERVENTIONS:  -  Assess patient's ability to carry out ADLs; assess patient's baseline for ADL function and identify physical deficits which impact ability to perform ADLs (bathing, care of mouth/teeth, toileting, grooming, dressing, etc.)  - Assess/evaluate cause of self-care deficits   - Assess range of motion  - Assess patient's mobility; develop plan if impaired  - Assess patient's need for assistive devices and provide as appropriate  - Encourage maximum independence but intervene and supervise when necessary  - Involve family in performance of ADLs  - Assess for home care needs following discharge   - Consider OT consult to assist with ADL evaluation and planning for discharge  - Provide patient education as appropriate  Outcome: Progressing  Goal: Maintains/Returns to pre admission functional level  Description: INTERVENTIONS:  - Perform BMAT or MOVE assessment daily.   - Set and communicate daily mobility goal to care team and patient/family/caregiver. - Collaborate with rehabilitation services on mobility goals if consulted  - Perform Range of Motion  times a day. - Reposition patient every  hours.   - Dangle patient  times a day  - Stand patient  times a day  - Ambulate patient  times a day  - Out of bed to chair  times a day   - Out of bed for meals  times a day  - Out of bed for toileting  - Record patient progress and toleration of activity level   Outcome: Progressing     Problem: DISCHARGE PLANNING  Goal: Discharge to home or other facility with appropriate resources  Description: INTERVENTIONS:  - Identify barriers to discharge w/patient and caregiver  - Arrange for needed discharge resources and transportation as appropriate  - Identify discharge learning needs (meds, wound care, etc.)  - Arrange for interpretive services to assist at discharge as needed  - Refer to Case Management Department for coordinating discharge planning if the patient needs post-hospital services based on physician/advanced practitioner order or complex needs related to functional status, cognitive ability, or social support system  Outcome: Progressing     Problem: Knowledge Deficit  Goal: Patient/family/caregiver demonstrates understanding of disease process, treatment plan, medications, and discharge instructions  Description: Complete learning assessment and assess knowledge base. Interventions:  - Provide teaching at level of understanding  - Provide teaching via preferred learning methods  Outcome: Progressing  Goal: Verbalizes understanding of labor plan  Description: Assess patient/family/caregiver's baseline knowledge level and ability to understand information. Provide education via patient/family/caregiver's preferred learning method at appropriate level of understanding. 1. Provide teaching at level of understanding. 2. Provide teaching via preferred learning method(s). Outcome: Progressing     Problem: Labor & Delivery  Goal: Manages discomfort  Description: Assess and monitor for signs and symptoms of discomfort. Assess patient's pain level regularly and per hospital policy. Administer medications as ordered. Support use of nonpharmacological methods to help control pain such as distraction, imagery, relaxation, and application of heat and cold. Collaborate with interdisciplinary team and patient to determine appropriate pain management plan. 1. Include patient in decisions related to comfort.   2. Offer non-pharmacological pain management interventions. 3. Report ineffective pain management to physician. Outcome: Progressing  Goal: Patient vital signs are stable  Description: 1. Assess vital signs - vaginal delivery.   Outcome: Progressing

## 2023-10-10 NOTE — OB LABOR/OXYTOCIN SAFETY PROGRESS
Oxytocin Safety Progress Check Note - Deuce Holden 41 y.o. female MRN: [id number]    Unit/Bed#: -01 Encounter: [id number]    Dose (tootie-units/min) Oxytocin: 24 tootie-units/min  Contraction Frequency (minutes): 3-3.5  Contraction Quality: Moderate  Tachysystole: No   Cervical Dilation: 5        Cervical Effacement: 70  Fetal Station: -1  Baseline Rate: 115 bpm  Fetal Heart Rate: 116 BPM  FHR Category: Cat II               Vital Signs:   Vitals:    10/10/23 1345   BP: 126/74   Pulse: 103   Resp:    Temp:    SpO2:        Notes/comments:   SVE unchanged. IUPC placed. Continue to monitor.  D/w Dr Kathi Silverman MD 10/10/2023 2:07 PM

## 2023-10-10 NOTE — OB LABOR/OXYTOCIN SAFETY PROGRESS
Labor Progress Note - Tod Rivera 41 y.o. female MRN: [id number]    Unit/Bed#:  201-01 Encounter: [id number]       Contraction Frequency (minutes): LALI  Contraction Quality: Mild  Tachysystole: No   Cervical Dilation: 3        Cervical Effacement: 50  Fetal Station: -3  Baseline Rate: 140 bpm  Fetal Heart Rate: 130 BPM  FHR Category: I               Vital Signs:   Vitals:    10/09/23 2325   BP: 132/86   Pulse: (!) 110   Resp:    Temp:    SpO2:        Notes/comments:   Patient resting comfortably, starting to get more pain with contractions. SVE as above. Intermittently having difficulty tracing baby and contractions on monitor so consider switch to Novie. Plan to start pitocin at midnight.     Cate Arguelles MD 10/9/2023 11:42 PM

## 2023-10-10 NOTE — OB LABOR/OXYTOCIN SAFETY PROGRESS
Oxytocin Safety Progress Check Note - Gina Reese 41 y.o. female MRN: [id number]    Unit/Bed#: -01 Encounter: [id number]    Dose (tootie-units/min) Oxytocin: 12 tootie-units/min  Contraction Frequency (minutes): 1.5-3.5  Contraction Quality: Mild  Tachysystole: No   Cervical Dilation: 4        Cervical Effacement: 60  Fetal Station: -1  Baseline Rate: 110 bpm  Fetal Heart Rate: 119 BPM  FHR Category: I, reassuring               Vital Signs:   Vitals:    10/10/23 0556   BP: 136/83   Pulse: 88   Resp:    Temp:    SpO2:        Notes/comments:     AROm for clear fluid. Head well applied. Continue pitocin.          [address] [facility],[address], DO 99/92/4241 7:17 AM [Follow-Up: _____] : a [unfilled] follow-up visit

## 2023-10-10 NOTE — OB LABOR/OXYTOCIN SAFETY PROGRESS
Oxytocin Safety Progress Check Note - Preethi Wyman 41 y.o. female MRN: [id number]    Unit/Bed#: -01 Encounter: [id number]    Dose (tootie-units/min) Oxytocin: 4 tootie-units/min (decels)  Contraction Frequency (minutes): 2-3  Contraction Quality: Mild  Tachysystole: No   Cervical Dilation: 3-4        Cervical Effacement: 50  Fetal Station: -3  Baseline Rate: 135 bpm  Fetal Heart Rate: 150 BPM  FHR Category: II               Vital Signs:   Vitals:    10/10/23 0410   BP: 140/87   Pulse: 88   Resp:    Temp:    SpO2:        Notes/comments:   FHR with some intermittent lates, resolving with maternal position change. SVE as above. Continue pitocin titration and maternal repositioning.      Sydnie Rankin MD 10/10/2023 4:43 AM

## 2023-10-10 NOTE — PLAN OF CARE
Problem: POSTPARTUM  Goal: Experiences normal postpartum course  Description: INTERVENTIONS:  - Monitor maternal vital signs  - Assess uterine involution and lochia  10/10/2023 1556 by Adam Napoles RN  Outcome: Progressing  10/10/2023 0959 by Adam Napoles RN  Outcome: Progressing  Goal: Appropriate maternal -  bonding  Description: INTERVENTIONS:  - Identify family support  - Assess for appropriate maternal/infant bonding   -Encourage maternal/infant bonding opportunities  - Referral to  or  as needed  10/10/2023 1556 by Adam Napoles RN  Outcome: Progressing  10/10/2023 0959 by dAam Napoles RN  Outcome: Progressing  Goal: Establishment of infant feeding pattern  Description: INTERVENTIONS:  - Assess breast/bottle feeding  - Refer to lactation as needed  10/10/2023 1556 by Adam Napoles RN  Outcome: Progressing  10/10/2023 0959 by Adam Napoles RN  Outcome: Progressing  Goal: Incision(s), wounds(s) or drain site(s) healing without S/S of infection  Description: INTERVENTIONS  - Assess and document dressing, incision, wound bed, drain sites and surrounding tissue  - Provide patient and family education  - Perform skin care/dressing changes every   10/10/2023 1556 by Adam Napoles RN  Outcome: Progressing  10/10/2023 0959 by Adam Napoles RN  Outcome: Progressing     Problem: PAIN - ADULT  Goal: Verbalizes/displays adequate comfort level or baseline comfort level  Description: Interventions:  - Encourage patient to monitor pain and request assistance  - Assess pain using appropriate pain scale  - Administer analgesics based on type and severity of pain and evaluate response  - Implement non-pharmacological measures as appropriate and evaluate response  - Consider cultural and social influences on pain and pain management  - Notify physician/advanced practitioner if interventions unsuccessful or patient reports new pain  10/10/2023 1556 by Adam Napoles RN  Outcome: Progressing  10/10/2023 4494 by Sergio Banerjee RN  Outcome: Progressing     Problem: INFECTION - ADULT  Goal: Absence or prevention of progression during hospitalization  Description: INTERVENTIONS:  - Assess and monitor for signs and symptoms of infection  - Monitor lab/diagnostic results  - Monitor all insertion sites, i.e. indwelling lines, tubes, and drains  - Monitor endotracheal if appropriate and nasal secretions for changes in amount and color  - [place] appropriate cooling/warming therapies per order  - Administer medications as ordered  - Instruct and encourage patient and family to use good hand hygiene technique  - Identify and instruct in appropriate isolation precautions for identified infection/condition  10/10/2023 1556 by Sergio Banerjee RN  Outcome: Progressing  10/10/2023 0959 by Sergio Banerjee RN  Outcome: Progressing  Goal: Absence of fever/infection during neutropenic period  Description: INTERVENTIONS:  - Monitor WBC    10/10/2023 1556 by Sergio Banerjee RN  Outcome: Progressing  10/10/2023 0959 by Sergio Banerjee RN  Outcome: Progressing     Problem: SAFETY ADULT  Goal: Patient will remain free of falls  Description: INTERVENTIONS:  - Educate patient/family on patient safety including physical limitations  - Instruct patient to call for assistance with activity   - Consult OT/PT to assist with strengthening/mobility   - Keep Call bell within reach  - Keep bed low and locked with side rails adjusted as appropriate  - Keep care items and personal belongings within reach  - Initiate and maintain comfort rounds  - Make Fall Risk Sign visible to staff  - Offer Toileting every  Hours, in advance of need  - Initiate/Maintain alarm  - Obtain necessary fall risk management equipment:   - Apply yellow socks and bracelet for high fall risk patients  - Consider moving patient to room near nurses station  10/10/2023 1556 by Sergio Banerjee RN  Outcome: Progressing  10/10/2023 0959 by Sergio Banerjee RN  Outcome: Progressing  Goal: Maintain or return to baseline ADL function  Description: INTERVENTIONS:  -  Assess patient's ability to carry out ADLs; assess patient's baseline for ADL function and identify physical deficits which impact ability to perform ADLs (bathing, care of mouth/teeth, toileting, grooming, dressing, etc.)  - Assess/evaluate cause of self-care deficits   - Assess range of motion  - Assess patient's mobility; develop plan if impaired  - Assess patient's need for assistive devices and provide as appropriate  - Encourage maximum independence but intervene and supervise when necessary  - Involve family in performance of ADLs  - Assess for home care needs following discharge   - Consider OT consult to assist with ADL evaluation and planning for discharge  - Provide patient education as appropriate  10/10/2023 1556 by Rahul Mckinney RN  Outcome: Progressing  10/10/2023 0959 by Rahul Mckinney RN  Outcome: Progressing  Goal: Maintains/Returns to pre admission functional level  Description: INTERVENTIONS:  - Perform BMAT or MOVE assessment daily.   - Set and communicate daily mobility goal to care team and patient/family/caregiver. - Collaborate with rehabilitation services on mobility goals if consulted  - Perform Range of Motion  times a day. - Reposition patient every  hours.   - Dangle patient  times a day  - Stand patient  times a day  - Ambulate patient  times a day  - Out of bed to chair  times a day   - Out of bed for meals  times a day  - Out of bed for toileting  - Record patient progress and toleration of activity level   10/10/2023 1556 by Rahul Mckinney RN  Outcome: Progressing  10/10/2023 0959 by Rahul Mckinney RN  Outcome: Progressing     Problem: DISCHARGE PLANNING  Goal: Discharge to home or other facility with appropriate resources  Description: INTERVENTIONS:  - Identify barriers to discharge w/patient and caregiver  - Arrange for needed discharge resources and transportation as appropriate  - Identify discharge learning needs (meds, wound care, etc.)  - Arrange for interpretive services to assist at discharge as needed  - Refer to Case Management Department for coordinating discharge planning if the patient needs post-hospital services based on physician/advanced practitioner order or complex needs related to functional status, cognitive ability, or social support system  10/10/2023 1556 by Ivis Mchugh RN  Outcome: Progressing  10/10/2023 0959 by Ivis Mchugh RN  Outcome: Progressing

## 2023-10-10 NOTE — ANESTHESIA PROCEDURE NOTES
Epidural Block    Patient location during procedure: OB/L&D  Start time: 10/10/2023 8:06 AM  Reason for block: primary anesthetic  Staffing  Performed by: Wilmer Edwards MD  Authorized by: Wilmer Edwards MD    Preanesthetic Checklist  Completed: patient identified, IV checked, site marked, risks and benefits discussed, surgical consent, monitors and equipment checked, pre-op evaluation and timeout performed  Epidural  Patient position: sitting  Prep: ChloraPrep  Sedation Level: no sedation  Patient monitoring: heart rate, continuous pulse oximetry and frequent blood pressure checks  Approach: midline  Location: lumbar, L3-4  Injection technique: CRISTEL saline  Needle  Needle type: Tuohy   Needle gauge: 18 G  Needle insertion depth: 5 cm  Catheter size: 20 G  Catheter at skin depth: 11 cm  Catheter securement method: clear occlusive dressing and stabilization device  Test dose: negative  Assessment  Number of attempts: 1negative aspiration for CSF, negative aspiration for heme and no paresthesia on injection  patient tolerated the procedure well with no immediate complications

## 2023-10-10 NOTE — ANESTHESIA POSTPROCEDURE EVALUATION
Post-Op Assessment Note    CV Status:  Stable  Pain Score: 0    Pain management: adequate     Mental Status:  Alert and awake   Hydration Status:  Euvolemic   PONV Controlled:  Controlled   Airway Patency:  Patent      Post Op Vitals Reviewed: Yes      Staff: CRNA     Post-op block assessment: site cleaned, catheter intact and no complications      No notable events documented.     BP      Temp      Pulse     Resp      SpO2

## 2023-10-10 NOTE — OB LABOR/OXYTOCIN SAFETY PROGRESS
Oxytocin Safety Progress Check Note - Dennis Terrytingham 41 y.o. female MRN: [id number]    Unit/Bed#: -01 Encounter: [id number]    Dose (tootie-units/min) Oxytocin: 22 tootie-units/min  Contraction Frequency (minutes): 3-4  Contraction Quality: Moderate  Tachysystole: No   Cervical Dilation: 6        Cervical Effacement: 70  Fetal Station: -1  Baseline Rate: 120 bpm  Fetal Heart Rate: 120 BPM  FHR Category: 1               Vital Signs:   Vitals:    10/10/23 1315   BP: 121/74   Pulse: 92   Resp: 18   Temp: 98.1 °F (36.7 °C)   SpO2:        Patient with increased pain/discomfort on left side through contractions. SVE now 6/70/-1. FHT cat 1, heriberto q3-4, pit @22 will continue to titrate. Will call anesthesia to reevaluate pain. Will d/w Dr. Ubaldo Salinas MD 10/10/2023 1:25 PM

## 2023-10-10 NOTE — PLAN OF CARE
Problem: ANTEPARTUM  Goal: Maintain pregnancy as long as maternal and/or fetal condition is stable  Description: INTERVENTIONS:  - Maternal surveillance  - Fetal surveillance  - Monitor uterine activity  - Medications as ordered  - Bedrest  Outcome: Progressing     Problem: BIRTH - VAGINAL/ SECTION  Goal: Fetal and maternal status remain reassuring during the birth process  Description: INTERVENTIONS:  - Monitor vital signs  - Monitor fetal heart rate  - Monitor uterine activity  - Monitor labor progression (vaginal delivery)  - DVT prophylaxis  - Antibiotic prophylaxis  Outcome: Progressing  Goal: Emotionally satisfying birthing experience for mother/fetus  Description: Interventions:  - Assess, plan, implement and evaluate the nursing care given to the patient in labor  - Advocate the philosophy that each childbirth experience is a unique experience and support the family's chosen level of involvement and control during the labor process   - Actively participate in both the patient's and family's teaching of the birth process  - Consider cultural, Ep[employer] and age-specific factors and plan care for the patient in labor  Outcome: Progressing     Problem: POSTPARTUM  Goal: Experiences normal postpartum course  Description: INTERVENTIONS:  - Monitor maternal vital signs  - Assess uterine involution and lochia  Outcome: Progressing  Goal: Appropriate maternal -  bonding  Description: INTERVENTIONS:  - Identify family support  - Assess for appropriate maternal/infant bonding   -Encourage maternal/infant bonding opportunities  - Referral to  or  as needed  Outcome: Progressing  Goal: Establishment of infant feeding pattern  Description: INTERVENTIONS:  - Assess breast/bottle feeding  - Refer to lactation as needed  Outcome: Progressing  Goal: Incision(s), wounds(s) or drain site(s) healing without S/S of infection  Description: INTERVENTIONS  - Assess and document dressing, incision, wound bed, drain sites and surrounding tissue  - Provide patient and family education  Outcome: Progressing     Problem: PAIN - ADULT  Goal: Verbalizes/displays adequate comfort level or baseline comfort level  Description: Interventions:  - Encourage patient to monitor pain and request assistance  - Assess pain using appropriate pain scale  - Administer analgesics based on type and severity of pain and evaluate response  - Implement non-pharmacological measures as appropriate and evaluate response  - Consider cultural and social influences on pain and pain management  - Notify physician/advanced practitioner if interventions unsuccessful or patient reports new pain  Outcome: Progressing     Problem: INFECTION - ADULT  Goal: Absence or prevention of progression during hospitalization  Description: INTERVENTIONS:  - Assess and monitor for signs and symptoms of infection  - Monitor lab/diagnostic results  - Monitor all insertion sites, i.e. indwelling lines, tubes, and drains  - Monitor endotracheal if appropriate and nasal secretions for changes in amount and color  - [place] appropriate cooling/warming therapies per order  - Administer medications as ordered  - Instruct and encourage patient and family to use good hand hygiene technique  - Identify and instruct in appropriate isolation precautions for identified infection/condition  Outcome: Progressing  Goal: Absence of fever/infection during neutropenic period  Description: INTERVENTIONS:  - Monitor WBC    Outcome: Progressing     Problem: SAFETY ADULT  Goal: Patient will remain free of falls  Description: INTERVENTIONS:  - Educate patient/family on patient safety including physical limitations  - Instruct patient to call for assistance with activity   - Consult OT/PT to assist with strengthening/mobility   - Keep Call bell within reach  - Keep bed low and locked with side rails adjusted as appropriate  - Keep care items and personal belongings within reach  - Initiate and maintain comfort rounds  - Make Fall Risk Sign visible to staff  -- Apply yellow socks and bracelet for high fall risk patients  - Consider moving patient to room near nurses station  Outcome: Progressing  Goal: Maintain or return to baseline ADL function  Description: INTERVENTIONS:  -  Assess patient's ability to carry out ADLs; assess patient's baseline for ADL function and identify physical deficits which impact ability to perform ADLs (bathing, care of mouth/teeth, toileting, grooming, dressing, etc.)  - Assess/evaluate cause of self-care deficits   - Assess range of motion  - Assess patient's mobility; develop plan if impaired  - Assess patient's need for assistive devices and provide as appropriate  - Encourage maximum independence but intervene and supervise when necessary  - Involve family in performance of ADLs  - Assess for home care needs following discharge   - Consider OT consult to assist with ADL evaluation and planning for discharge  - Provide patient education as appropriate  Outcome: Progressing  Goal: Maintains/Returns to pre admission functional level  Description: INTERVENTIONS:  - Perform BMAT or MOVE assessment daily.   - Set and communicate daily mobility goal to care team and patient/family/caregiver.    - Collaborate with rehabilitation services on mobility goals if consulted  - Out of bed for toileting  - Record patient progress and toleration of activity level   Outcome: Progressing     Problem: DISCHARGE PLANNING  Goal: Discharge to home or other facility with appropriate resources  Description: INTERVENTIONS:  - Identify barriers to discharge w/patient and caregiver  - Arrange for needed discharge resources and transportation as appropriate  - Identify discharge learning needs (meds, wound care, etc.)  - Arrange for interpretive services to assist at discharge as needed  - Refer to Case Management Department for coordinating discharge planning if the patient needs post-hospital services based on physician/advanced practitioner order or complex needs related to functional status, cognitive ability, or social support system  Outcome: Progressing     Problem: Knowledge Deficit  Goal: Patient/family/caregiver demonstrates understanding of disease process, treatment plan, medications, and discharge instructions  Description: Complete learning assessment and assess knowledge base. Interventions:  - Provide teaching at level of understanding  - Provide teaching via preferred learning methods  Outcome: Progressing  Goal: Verbalizes understanding of labor plan  Description: Assess patient/family/caregiver's baseline knowledge level and ability to understand information. Provide education via patient/family/caregiver's preferred learning method at appropriate level of understanding. 1. Provide teaching at level of understanding. 2. Provide teaching via preferred learning method(s). Outcome: Progressing     Problem: Labor & Delivery  Goal: Manages discomfort  Description: Assess and monitor for signs and symptoms of discomfort. Assess patient's pain level regularly and per hospital policy. Administer medications as ordered. Support use of nonpharmacological methods to help control pain such as distraction, imagery, relaxation, and application of heat and cold. Collaborate with interdisciplinary team and patient to determine appropriate pain management plan. 1. Include patient in decisions related to comfort. 2. Offer non-pharmacological pain management interventions. 3. Report ineffective pain management to physician. Outcome: Progressing  Goal: Patient vital signs are stable  Description: 1. Assess vital signs - vaginal delivery.   Outcome: Progressing

## 2023-10-11 PROCEDURE — 99024 POSTOP FOLLOW-UP VISIT: CPT | Performed by: PHYSICIAN ASSISTANT

## 2023-10-11 RX ORDER — NIFEDIPINE 30 MG/1
60 TABLET, EXTENDED RELEASE ORAL EVERY 24 HOURS
Status: DISCONTINUED | OUTPATIENT
Start: 2023-10-11 | End: 2023-10-12 | Stop reason: HOSPADM

## 2023-10-11 RX ADMIN — IBUPROFEN 600 MG: 600 TABLET ORAL at 22:57

## 2023-10-11 RX ADMIN — DOCUSATE SODIUM 100 MG: 100 CAPSULE, LIQUID FILLED ORAL at 18:08

## 2023-10-11 RX ADMIN — IBUPROFEN 600 MG: 600 TABLET ORAL at 18:08

## 2023-10-11 RX ADMIN — IBUPROFEN 600 MG: 600 TABLET ORAL at 12:04

## 2023-10-11 RX ADMIN — IBUPROFEN 600 MG: 600 TABLET ORAL at 05:21

## 2023-10-11 RX ADMIN — DOCUSATE SODIUM 100 MG: 100 CAPSULE, LIQUID FILLED ORAL at 09:07

## 2023-10-11 RX ADMIN — NIFEDIPINE 60 MG: 30 TABLET, EXTENDED RELEASE ORAL at 23:03

## 2023-10-11 NOTE — LACTATION NOTE
This note was copied from a baby's chart. CONSULT - LACTATION  Baby Boy Gunnar Ivy 1 days male MRN: [id number]    [facility] NURSERY Room / Bed: (N)/(N) Encounter: [id number]    Maternal Information     MOTHER:  Michele Conner  Maternal Age: 41 y.o.   OB History: # 1 - Date: 19, Sex: Female, Weight: 3912 g (8 lb 10 oz), GA: 39w1d, Delivery: Vaginal, Spontaneous, Apgar1: 8, Apgar5: 9, Living: Living, Birth Comments: None    # 2 - Date: 10/10/23, Sex: Male, Weight: 2800 g (6 lb 2.8 oz), GA: 36w4d, Delivery: Vaginal, Spontaneous, Apgar1: 9, Apgar5: 9, Living: Living, Birth Comments: None   Previouse breast reduction surgery? No    Lactation history:   Has patient previously breast fed: How long had patient previously breast fed:     Previous breast feeding complications:     History reviewed. No pertinent surgical history. Birth information:  YOB: 2022   Time of birth: 3:28 PM   Sex: male   Delivery type: Vaginal, Spontaneous   Birth Weight: 2800 g (6 lb 2.8 oz)   Percent of Weight Change: -1%     Gestational Age: 40w2d   [unfilled]    Assessment     Breast and nipple assessment:  symmetrical, pendulous breasts with dark areolas and long, everted nipples    [place] Assessment: normal assessment    Feeding assessment: latch difficulty (due to gest. age)  LATCH:  Latch: Repeated attempts, hold nipple in mouth, stimulate to suck   Audible Swallowing: A few with stimulation   Type of Nipple: Everted (After stimulation)   Comfort (Breast/Nipple): Soft/non-tender   Hold (Positioning): No assist from staff, mother able to position/hold infant   LATCH Score: 8          Feeding recommendations:   mix feeding. Baby is on glucose protocols. Mom has chosen to give Formula via syringe. Ed. On mix feeding. Enc. S2s and non-nutritive suck after the feeding. Enc. Latch on the breast prior to the supplementation.  Once baby appears tired or muscle fatigue, feed supplementation via syringe and bring baby to the opposite breast.  Ed. On use of pump /hand pump after the feeding session. Mom has a s1 at home from ins. RSB/DC and handouts reviewed  Handouts: Mother-led latch,   1st 2 weeks,   WHO How to mix formula,   Pumping Log,   Alt. Feeding,    Enc. To call lactation    Education on positioning and alignment. Mom is encouraged to:     - Bring baby up to the breast (use of pillows to elevate so baby's torso is against mom's breasts)   - Skin to skin for feedings with top hand exposed to show signs of satiation   - Chin deep into breast tissue (make baby look up to the nipple)   - nose aligned to the nipple   -Wait for wide gape, drag chin on the breast so nipple is aimed at the upper, back palate  - Cheek should be touching breast   - Deep, firm hold of baby with ear, shoulder, hip alignment    Mom's nipples lengthen dramatically when stimulated with the manual and electric pump. Education on sizing of flanges and use of lanolin at 90 degree angle on hospital flanges to assist with rubbing on areola. Education on use of pumping pals flanges that remove the 90 degree angle and therefore remove the friction on the areola. Education provided. Information on hand expression given. Discussed benefits of knowing how to manually express breast including stimulating milk supply, softening nipple for latch and evacuating breast in the event of engorgement. Mom is encouraged to place baby skin to skin for feedings. Skin to skin education provided for baby placement on mother's chest, baby only in diaper, blankets below shoulders on baby's back. Skin to skin is encouraged to continue at home for feedings and between feedings. Worked on positioning infant up at chest level and starting to feed infant with nose arriving at the nipple. Then, using areolar compression to achieve a deep latch that is comfortable and exchanges optimum amounts of milk.      - Start feedings on breast that last feeding ended   - allow no more than 3 hours between breast feeding sessions   - time between feedings is counted from the beginning of the first feed to the beginning of the next feeding session    Reviewed early signs of hunger, including tensing of hands and shoulders - no need to wait for open eyes. Crying is a late hunger sign. If baby is crying, soothe baby first and then attempt to latch. Reviewed normal sucking patterns: transition from stimulation to nutritive to release or non-nutritive. The goal is to see and hear lots of swallowing. Reviewed normal nursing pattern: infant could latch on one breast up to 30 minutes or until releases on own. Signs of satiation is open hand with fingers that do not grab your finger. Discussed difference in sensation of non-nutritive v nutritive sucking    Met with mother. Provided mother with Ready, Set, Baby booklet. Discussed Skin to Skin contact an benefits to mom and baby. Talked about the delay of the first bath until baby has adjusted. Spoke about the benefits of rooming in. Feeding on cue and what that means for recognizing infant's hunger. Avoidance of pacifiers for the first month discussed. Talked about exclusive breastfeeding for the first 6 months. Positioning and latch reviewed as well as showing images of other feeding positions. Discussed the properties of a good latch in any position. Reviewed hand/manual expression. Discussed s/s that baby is getting enough milk and some s/s that breastfeeding dyad may need further help. Gave information on common concerns, what to expect the first few weeks after delivery, preparing for other caregivers, and how partners can help. Resources for support also provided. Encouraged parents to call for assistance, questions, and concerns about breastfeeding. Extension provided.     Provided education on growth spurts, when to introduce bottles; paced bottle feeding, and non-nutritive suck at the breast. Provided education on Signs of satiation. Encouraged to call lactation to observe a latch prior to discharge for reassurance. Encouraged to call baby and me with any questions and closely monitor output.     Tiago Dill 10/11/2023 12:57 PM

## 2023-10-11 NOTE — PLAN OF CARE

## 2023-10-11 NOTE — PROGRESS NOTES
Progress Note - OB/GYN  Chela Guerrero 41 y.o. female MRN: [id number]  Unit/Bed#:  302-01 Encounter: [id number]    Assessment and Plan     Chela Guerrero is a patient of: Kris. She is PPD# 1 s/p  spontaneous vaginal delivery  Recovering well and is stable       Hypokalemia  Assessment & Plan  K+ 2.9 on admission  Will replete with PO kdur 40 meq    Preeclampsia with severe features  Assessment & Plan  Severe feature = BP increasing despite higher doses of Procardia  On Mg throughout labor  Mg turned off immediately postpartum due to MADDY atony, restarted last night, discontinued this AM  Currently on Procardia 60mg Q24 hours  Procardia given at midnight. Will transition to 8pm  Bps WNL last 24 hours (120s/80s)  Currently asymptomatic      History of pre-eclampsia in prior pregnancy, currently pregnant in third trimester  Assessment & Plan  PreE without SF diagnosed at term    Uterine fibroids affecting pregnancy  Assessment & Plan  Intramural anterior corpus 1.4 x 1.4 x 1.7cm  Subserosal anterior corpus region 2.5 x 1.4 x 2.5cm    *  (spontaneous vaginal delivery)  Assessment & Plan       Continue routine post partum care  Pain well controlled: tylenol/motrin scheduled  Lochia within normal limits: continue to monitor   OOB: as able, encourage ambulation  Passing flatus  Voiding spontaneously  Breastfeeding  Baby in: room  Dispo: anticipate d/c home pending blood pressure management           Disposition    -  Anticipate discharge home on PPD# 2 (10/12/23)      Subjective/Objective     Chief Complaint: Postpartum State     Subjective:    Chela Guerrero is PPD/POD#1 s/p  spontaneous vaginal delivery. She has no current complaints. Pain is well controlled. She is recovering well and is stable.      Breastfeeding:  yes    Tolerating PO: yes  Nausea or Vomiting: no  Voiding: yes  Ambulating: yes  Chest pain: no  Shortness of breath: no  Leg pain: no  Lochia: appropriate     Vitals:   /80 Pulse 80   Temp 98.8 °F (37.1 °C) (Oral)   Resp 19   Ht 5' 9" (1.753 m)   Wt 89.4 kg (197 lb)   LMP 01/19/2023 (Approximate)   SpO2 97%   Breastfeeding Yes   BMI 29.09 kg/m²       Intake/Output Summary (Last 24 hours) at 10/11/2023 1010  Last data filed at 10/10/2023 2043  Gross per 24 hour   Intake 4170.05 ml   Output 2676 ml   Net 1494.05 ml       Invasive Devices       Peripheral Intravenous Line  Duration             Peripheral IV 10/09/23 Distal;Dorsal (posterior); Left Forearm 1 day                    Physical Exam:   GEN: Meagan Dawn appears well, alert and oriented x 3, pleasant and cooperative   RESP:  normal effort        Labs:     Hemoglobin   Date Value Ref Range Status   10/10/2023 10.6 (L) 11.5 - 15.4 g/dL Final   10/10/2023 11.2 (L) 11.5 - 15.4 g/dL Final     WBC   Date Value Ref Range Status   10/10/2023 14.08 (H) 4.31 - 10.16 Thousand/uL Final   10/10/2023 12.52 (H) 4.31 - 10.16 Thousand/uL Final     Platelets   Date Value Ref Range Status   10/10/2023 268 149 - 390 Thousands/uL Final   10/10/2023 284 149 - 390 Thousands/uL Final     Creatinine   Date Value Ref Range Status   10/10/2023 0.72 0.60 - 1.30 mg/dL Final     Comment:     Standardized to IDMS reference method   10/10/2023 0.65 0.60 - 1.30 mg/dL Final     Comment:     Standardized to IDMS reference method     AST   Date Value Ref Range Status   10/10/2023 10 (L) 13 - 39 U/L Final   10/10/2023 12 (L) 13 - 39 U/L Final   04/27/2023 13 0 - 40 IU/L Final   08/14/2019 15 0 - 40 IU/L Final     ALT   Date Value Ref Range Status   10/10/2023 7 7 - 52 U/L Final     Comment:     Specimen collection should occur prior to Sulfasalazine administration due to the potential for falsely depressed results. 10/10/2023 8 7 - 52 U/L Final     Comment:     Specimen collection should occur prior to Sulfasalazine administration due to the potential for falsely depressed results.     04/27/2023 8 0 - 32 IU/L Final   08/14/2019 12 0 - 32 IU/L Final Theron Bob PA-C  10/11/2023  10:10 AM

## 2023-10-11 NOTE — PLAN OF CARE
Problem: POSTPARTUM  Goal: Experiences normal postpartum course  Description: INTERVENTIONS:  - Monitor maternal vital signs  - Assess uterine involution and lochia  Outcome: Progressing  Goal: Appropriate maternal -  bonding  Description: INTERVENTIONS:  - Identify family support  - Assess for appropriate maternal/infant bonding   -Encourage maternal/infant bonding opportunities  - Referral to  or  as needed  Outcome: Progressing  Goal: Establishment of infant feeding pattern  Description: INTERVENTIONS:  - Assess breast/bottle feeding  - Refer to lactation as needed  Outcome: Progressing  Goal: Incision(s), wounds(s) or drain site(s) healing without S/S of infection  Description: INTERVENTIONS  - Assess and document dressing, incision, wound bed, drain sites and surrounding tissue  - Provide patient and family education  - Perform skin care/dressing changes every   Outcome: Progressing     Problem: PAIN - ADULT  Goal: Verbalizes/displays adequate comfort level or baseline comfort level  Description: Interventions:  - Encourage patient to monitor pain and request assistance  - Assess pain using appropriate pain scale  - Administer analgesics based on type and severity of pain and evaluate response  - Implement non-pharmacological measures as appropriate and evaluate response  - Consider cultural and social influences on pain and pain management  - Notify physician/advanced practitioner if interventions unsuccessful or patient reports new pain  Outcome: Progressing     Problem: INFECTION - ADULT  Goal: Absence or prevention of progression during hospitalization  Description: INTERVENTIONS:  - Assess and monitor for signs and symptoms of infection  - Monitor lab/diagnostic results  - Monitor all insertion sites, i.e. indwelling lines, tubes, and drains  - Monitor endotracheal if appropriate and nasal secretions for changes in amount and color  - [place] appropriate cooling/warming therapies per order  - Administer medications as ordered  - Instruct and encourage patient and family to use good hand hygiene technique  - Identify and instruct in appropriate isolation precautions for identified infection/condition  Outcome: Progressing  Goal: Absence of fever/infection during neutropenic period  Description: INTERVENTIONS:  - Monitor WBC    Outcome: Progressing     Problem: SAFETY ADULT  Goal: Patient will remain free of falls  Description: INTERVENTIONS:  - Educate patient/family on patient safety including physical limitations  - Instruct patient to call for assistance with activity   - Consult OT/PT to assist with strengthening/mobility   - Keep Call bell within reach  - Keep bed low and locked with side rails adjusted as appropriate  - Keep care items and personal belongings within reach  - Initiate and maintain comfort rounds  - Make Fall Risk Sign visible to staff  - Offer Toileting every  Hours, in advance of need  - Initiate/Maintain alarm  - Obtain necessary fall risk management equipment:   - Apply yellow socks and bracelet for high fall risk patients  - Consider moving patient to room near nurses station  Outcome: Progressing  Goal: Maintain or return to baseline ADL function  Description: INTERVENTIONS:  -  Assess patient's ability to carry out ADLs; assess patient's baseline for ADL function and identify physical deficits which impact ability to perform ADLs (bathing, care of mouth/teeth, toileting, grooming, dressing, etc.)  - Assess/evaluate cause of self-care deficits   - Assess range of motion  - Assess patient's mobility; develop plan if impaired  - Assess patient's need for assistive devices and provide as appropriate  - Encourage maximum independence but intervene and supervise when necessary  - Involve family in performance of ADLs  - Assess for home care needs following discharge   - Consider OT consult to assist with ADL evaluation and planning for discharge  - Provide patient education as appropriate  Outcome: Progressing  Goal: Maintains/Returns to pre admission functional level  Description: INTERVENTIONS:  - Perform BMAT or MOVE assessment daily.   - Set and communicate daily mobility goal to care team and patient/family/caregiver. - Collaborate with rehabilitation services on mobility goals if consulted  - Perform Range of Motion  times a day. - Reposition patient every  hours.   - Dangle patient  times a day  - Stand patient  times a day  - Ambulate patient  times a day  - Out of bed to chair  times a day   - Out of bed for meals  times a day  - Out of bed for toileting  - Record patient progress and toleration of activity level   Outcome: Progressing     Problem: DISCHARGE PLANNING  Goal: Discharge to home or other facility with appropriate resources  Description: INTERVENTIONS:  - Identify barriers to discharge w/patient and caregiver  - Arrange for needed discharge resources and transportation as appropriate  - Identify discharge learning needs (meds, wound care, etc.)  - Arrange for interpretive services to assist at discharge as needed  - Refer to Case Management Department for coordinating discharge planning if the patient needs post-hospital services based on physician/advanced practitioner order or complex needs related to functional status, cognitive ability, or social support system  Outcome: Progressing

## 2023-10-12 VITALS
HEIGHT: 69 IN | DIASTOLIC BLOOD PRESSURE: 92 MMHG | OXYGEN SATURATION: 96 % | TEMPERATURE: 98.2 F | WEIGHT: 197 LBS | SYSTOLIC BLOOD PRESSURE: 138 MMHG | HEART RATE: 83 BPM | BODY MASS INDEX: 29.18 KG/M2 | RESPIRATION RATE: 18 BRPM

## 2023-10-12 PROBLEM — D25.9 UTERINE FIBROIDS AFFECTING PREGNANCY: Status: RESOLVED | Noted: 2023-06-29 | Resolved: 2023-10-12

## 2023-10-12 PROBLEM — E87.6 HYPOKALEMIA: Status: RESOLVED | Noted: 2023-10-09 | Resolved: 2023-10-12

## 2023-10-12 PROBLEM — O09.293 HISTORY OF PRE-ECLAMPSIA IN PRIOR PREGNANCY, CURRENTLY PREGNANT IN THIRD TRIMESTER: Status: RESOLVED | Noted: 2023-08-31 | Resolved: 2023-10-12

## 2023-10-12 PROBLEM — O34.10 UTERINE FIBROIDS AFFECTING PREGNANCY: Status: RESOLVED | Noted: 2023-06-29 | Resolved: 2023-10-12

## 2023-10-12 PROCEDURE — 99024 POSTOP FOLLOW-UP VISIT: CPT | Performed by: OBSTETRICS & GYNECOLOGY

## 2023-10-12 RX ORDER — NIFEDIPINE 30 MG/1
30 TABLET, EXTENDED RELEASE ORAL DAILY
Status: DISCONTINUED | OUTPATIENT
Start: 2023-10-12 | End: 2023-10-12 | Stop reason: HOSPADM

## 2023-10-12 RX ORDER — NIFEDIPINE 30 MG/1
TABLET, EXTENDED RELEASE ORAL
Qty: 90 TABLET | Refills: 0 | Status: SHIPPED | OUTPATIENT
Start: 2023-10-12

## 2023-10-12 RX ADMIN — DOCUSATE SODIUM 100 MG: 100 CAPSULE, LIQUID FILLED ORAL at 08:44

## 2023-10-12 RX ADMIN — IBUPROFEN 600 MG: 600 TABLET ORAL at 04:46

## 2023-10-12 RX ADMIN — NIFEDIPINE 30 MG: 30 TABLET, EXTENDED RELEASE ORAL at 08:44

## 2023-10-12 NOTE — PROGRESS NOTES
Obstetrics Progress Note  Deuce Holden 41 y.o. female MRN: [id number]  Unit/Bed#: -01 Encounter: [id number]    Assessment/Plan:    Postpartum Day #2 s/p , currently stable. Baby in room. By issue:    *  (spontaneous vaginal delivery)  Assessment & Plan       Continue routine post partum care  Pain well controlled: tylenol/motrin scheduled  Lochia within normal limits: continue to monitor   OOB: as able, encourage ambulation  Passing flatus  Voiding spontaneously  Breastfeeding  Baby in: room  Dispo: anticipate d/c home pending blood pressure management       Hypokalemia  Assessment & Plan  K+ 2.9 on admission  Will replete with PO kdur 40 meq    Preeclampsia with severe features  Assessment & Plan  Severe feature = BP increasing despite higher doses of Procardia  S/p 24 hours Magnesium  Currently on Procardia 60mg Q24 hours  Bps WNL last 24 hours:      - Systolic (08HHA), UEO:053 , Min:129 , YDY:427      - Diastolic (42OKR), BSV:39, Min:74, Max:91  Currently asymptomatic      History of pre-eclampsia in prior pregnancy, currently pregnant in third trimester  Assessment & Plan  PreE with SF diagnosed in this pregnancy intrapartum    Uterine fibroids affecting pregnancy  Assessment & Plan  Intramural anterior corpus 1.4 x 1.4 x 1.7cm  Subserosal anterior corpus region 2.5 x 1.4 x 2.5cm        Subjective/Objective     Subjective:   No acute events overnight. Pain: controlled. Tolerating PO: yes. Voiding: yes. Flatus: yes. Ambulating: yes. Chest pain: no. Shortness of breath: no. Leg pain: no. Lochia: within normal limits.  Baby in room, feeding via breast.    Objective:   Vitals:   Temp:  [97.9 °F (36.6 °C)-98.8 °F (37.1 °C)] 97.9 °F (36.6 °C)  HR:  [77-80] 77  Resp:  [16-19] 16  BP: (129-146)/(74-91) 146/91     No intake or output data in the 24 hours ending 10/12/23 0617    Lab Results   Component Value Date    WBC 14.08 (H) 10/10/2023    HGB 10.6 (L) 10/10/2023    HCT 31.9 (L) 10/10/2023    MCV 89 10/10/2023     10/10/2023       Physical Exam:   General: alert and oriented x3, in no apparent distress  Cardiovascular: regular rate  Pulmonary: normal effort  Abdomen: Soft, non-tender, non-distended, no rebound or guarding.  Uterine fundus firm and non-tender, at the umbilicus  Extremities: Non tender with no edema      Baron Ledezma MD  PGY-I, OBGYN  10/12/2023, 6:17 AM

## 2023-10-12 NOTE — LACTATION NOTE
This note was copied from a baby's chart. Discharge Lactation: mom called out for [facility]  - mom has been keeping the mixed feeding plan. Mom has baby on the right breast in cross cradle hold. Deep latch noted. Baby unlatched and discussion on DBM for home was started. Assisted mom with pumping on multi-user pump. Mom expressed 40 mls or transitional milk. Demonstration and teach back of paced bottle feeding. Enc. Mom to continue with feeding plan created and ed. On how to cycle and use hands on techniques with spectra pump at home. Pumpin pals encouraged. Mom states she used them with her first child. Mom agrees to baby and me visit - called and left vm    D/c and pumping log reviewed    Enc. To call lactation. Education on positioning and alignment. Mom is encouraged to:     - Bring baby up to the breast (use of pillows to elevate so baby's torso is against mom's breasts)   - Skin to skin for feedings with top hand exposed to show signs of satiation   - Chin deep into breast tissue (make baby look up to the nipple)   - nose aligned to the nipple   -Wait for wide gape, drag chin on the breast so nipple is aimed at the upper, back palate  - Cheek should be touching breast   - Deep, firm hold of baby with ear, shoulder, hip alignment    Provided demonstration, education and support of deep latch to breast by placing the nipple to the nose, dragging down to chin to achieve a wide latch. Bring baby to the breast, not breast to baby. Move your shoulders down and away from your ears. Look for ear, shoulder, hip alignment. Baby's upper and lower lip should be flanged on the breast.    Feed expressed milk or formula as needed/desired. Paced bottle feeding technique is less stressful for your baby, prevents overfeeding and protects the breastfeeding relationship. You can find a video about paced bottle feeding at www.[email or web address] org or [employer] on [email or web address].     Pumping:   - When pumping, begin in stimulation mode (high cycle, low vacuum) until milk begins to express. Change pump to expression mode (low cycle, high vacuum). Use hands on pumping techniques to assist with milk transfer. When milk stops expressing, change back to stimulation mode. When milk begins to flow, change to expression mode. You make cycle pump up to three times in a pumping session. Spectra Education on turning on the pump, press the 3 wavy lines to place pump on stimulation mode (high cycle, low vacuum) Set vacuum to comfort with light suction. After 3 min, press 3 wavy lines and change setting to Expression mode (low Cycle, High vacuum) Vacuum setting should not pinch, only tug the nipple. Now pump is set. Next time mom pumps, will only need to turn on pump and press 3 wavy line button to change cycle three times in a pumping session. Discussed 2nd night syndrome and ways to calm infant. Hand out given. Information on hand expression given. Discussed benefits of knowing how to manually express breast including stimulating milk supply, softening nipple for latch and evacuating breast in the event of engorgement. Provided education on growth spurts, when to introduce bottles; paced bottle feeding, and non-nutritive suck at the breast. Provided education on Signs of satiation. Encouraged to call lactation to observe a latch prior to discharge for reassurance. Encouraged to call baby and me with any questions and closely monitor output.

## 2023-10-12 NOTE — PLAN OF CARE
Problem: POSTPARTUM  Goal: Experiences normal postpartum course  Description: INTERVENTIONS:  - Monitor maternal vital signs  - Assess uterine involution and lochia  Outcome: Completed  Goal: Appropriate maternal -  bonding  Description: INTERVENTIONS:  - Identify family support  - Assess for appropriate maternal/infant bonding   -Encourage maternal/infant bonding opportunities  - Referral to  or  as needed  Outcome: Completed  Goal: Establishment of infant feeding pattern  Description: INTERVENTIONS:  - Assess breast/bottle feeding  - Refer to lactation as needed  Outcome: Completed  Goal: Incision(s), wounds(s) or drain site(s) healing without S/S of infection  Description: INTERVENTIONS  - Assess and document dressing, incision, wound bed, drain sites and surrounding tissue  - Provide patient and family education  - Perform skin care/dressing changes every   Outcome: Completed     Problem: PAIN - ADULT  Goal: Verbalizes/displays adequate comfort level or baseline comfort level  Description: Interventions:  - Encourage patient to monitor pain and request assistance  - Assess pain using appropriate pain scale  - Administer analgesics based on type and severity of pain and evaluate response  - Implement non-pharmacological measures as appropriate and evaluate response  - Consider cultural and social influences on pain and pain management  - Notify physician/advanced practitioner if interventions unsuccessful or patient reports new pain  Outcome: Completed     Problem: INFECTION - ADULT  Goal: Absence or prevention of progression during hospitalization  Description: INTERVENTIONS:  - Assess and monitor for signs and symptoms of infection  - Monitor lab/diagnostic results  - Monitor all insertion sites, i.e. indwelling lines, tubes, and drains  - Monitor endotracheal if appropriate and nasal secretions for changes in amount and color  - [place] appropriate cooling/warming therapies per order  - Administer medications as ordered  - Instruct and encourage patient and family to use good hand hygiene technique  - Identify and instruct in appropriate isolation precautions for identified infection/condition  Outcome: Completed  Goal: Absence of fever/infection during neutropenic period  Description: INTERVENTIONS:  - Monitor WBC    Outcome: Completed     Problem: SAFETY ADULT  Goal: Patient will remain free of falls  Description: INTERVENTIONS:  - Educate patient/family on patient safety including physical limitations  - Instruct patient to call for assistance with activity   - Consult OT/PT to assist with strengthening/mobility   - Keep Call bell within reach  - Keep bed low and locked with side rails adjusted as appropriate  - Keep care items and personal belongings within reach  - Initiate and maintain comfort rounds  - Make Fall Risk Sign visible to staff  - Offer Toileting every  Hours, in advance of need  - Initiate/Maintain alarm  - Obtain necessary fall risk management equipment:   - Apply yellow socks and bracelet for high fall risk patients  - Consider moving patient to room near nurses station  Outcome: Completed  Goal: Maintain or return to baseline ADL function  Description: INTERVENTIONS:  -  Assess patient's ability to carry out ADLs; assess patient's baseline for ADL function and identify physical deficits which impact ability to perform ADLs (bathing, care of mouth/teeth, toileting, grooming, dressing, etc.)  - Assess/evaluate cause of self-care deficits   - Assess range of motion  - Assess patient's mobility; develop plan if impaired  - Assess patient's need for assistive devices and provide as appropriate  - Encourage maximum independence but intervene and supervise when necessary  - Involve family in performance of ADLs  - Assess for home care needs following discharge   - Consider OT consult to assist with ADL evaluation and planning for discharge  - Provide patient education as appropriate  Outcome: Completed  Goal: Maintains/Returns to pre admission functional level  Description: INTERVENTIONS:  - Perform BMAT or MOVE assessment daily.   - Set and communicate daily mobility goal to care team and patient/family/caregiver. - Collaborate with rehabilitation services on mobility goals if consulted  - Perform Range of Motion  times a day. - Reposition patient every  hours.   - Dangle patient  times a day  - Stand patient  times a day  - Ambulate patient  times a day  - Out of bed to chair  times a day   - Out of bed for meals  times a day  - Out of bed for toileting  - Record patient progress and toleration of activity level   Outcome: Completed     Problem: DISCHARGE PLANNING  Goal: Discharge to home or other facility with appropriate resources  Description: INTERVENTIONS:  - Identify barriers to discharge w/patient and caregiver  - Arrange for needed discharge resources and transportation as appropriate  - Identify discharge learning needs (meds, wound care, etc.)  - Arrange for interpretive services to assist at discharge as needed  - Refer to Case Management Department for coordinating discharge planning if the patient needs post-hospital services based on physician/advanced practitioner order or complex needs related to functional status, cognitive ability, or social support system  Outcome: Completed

## 2023-10-12 NOTE — PLAN OF CARE

## 2023-10-13 NOTE — UTILIZATION REVIEW
MOTHER AND BABY DISCHARGED OCT 12    NOTIFICATION OF INPATIENT ADMISSION   MATERNITY/DELIVERY AUTHORIZATION REQUEST   SERVICING FACILITY:   [address] [place] [address] - L&D, [place], NICU  [address] [facility]. [address]  Tax ID: [id number]  NPI: [id number]   ATTENDING PROVIDER:  Attending Name and NPI#: [id number],[address], [place] [[id number]]  Address: [address] [place] [address]  Phone: [phone]   ADMISSION INFORMATION:  [facility]patient [address] [facility]   Place of Service Code: 21  Inpatient Admission Date/Time: 10/9/23  5:42 PM  Discharge Date/Time: 10/12/2023  2:11 PM  Admitting Diagnosis Code/Description:  Elevated blood pressure complicating pregnancy, antepartum [O16.9]  36 weeks gestation of pregnancy [Z3A.36]  Encounter for full-term uncomplicated delivery [P76]     Mother: Trent Randle 1985 Estimated Date of Delivery: 11/3/23  Delivering clinician: [address] [place] [address],[address]    OB History as of 10/12/2023          2    Para   2    Term   1       1    AB        Living   2         SAB        IAB        Ectopic        Multiple   0    Live Births   2                Name & MRN:   Information for the patient's :  Gail Skaggs [[id number]]    Delivery Information:  Sex: male  Delivered 10/10/2023 3:28 PM by Vaginal, Spontaneous; Gestational Age: 40w2d     Measurements:  Weight: 6 lb 2.8 oz (2800 g); Height: 19"    APGAR 1 minute 5 minutes 10 minutes   Totals: 9 9       Birth Information: 41 y.o. female MRN: [id number] Unit/Bed#: -01   Birthweight: No birth weight on file. Gestational Age: <None> Delivery Type:    APGARS Totals:        UTILIZATION REVIEW CONTACT:  Shae Sol Utilization   Network Utilization Review Department  Phone: [phone]  Fax [phone]  Email: [email or web address]  Contact for approvals/pending authorizations, clinical reviews, and discharge. PHYSICIAN ADVISORY SERVICES:  Medical Necessity Denial & Ijor-xv-Trva Review  Phone: [phone]  Fax: [phone]  Email: [email or web address]     DISCHARGE SUPPORT TEAM:  For Patients Discharge Needs & Updates  Phone: [phone] opt. 2 Fax: [phone]  Email: [email or web address]

## 2023-10-16 ENCOUNTER — CLINICAL SUPPORT (OUTPATIENT)
Dept: OBGYN CLINIC | Facility: CLINIC | Age: 38
End: 2023-10-16

## 2023-10-16 VITALS — DIASTOLIC BLOOD PRESSURE: 88 MMHG | SYSTOLIC BLOOD PRESSURE: 140 MMHG

## 2023-10-16 PROCEDURE — 88307 TISSUE EXAM BY PATHOLOGIST: CPT | Performed by: PATHOLOGY

## 2023-10-16 PROCEDURE — 99024 POSTOP FOLLOW-UP VISIT: CPT

## 2023-10-16 NOTE — PROGRESS NOTES
Patient here for a blood pressure check. She stated that she is currently living with her in-laws 1 1/2hr away while her home is under renovations. She also said that her baby is going through issues with jaundice and has to be seen / have lab work often. She said her BP this morning was 133/84; on Procardia 90mg daily (30mg AM, 60mg PM)  Reviewed results with - said results okay, if she has any pressures at home greater than 150/100- give us a call right away. Patient would also like to keep us posted on her results- she will send a Jini message on Fridays with her BP readings for us to know them. She was very thankful / agreeable with 's recommendations.

## 2023-11-13 DIAGNOSIS — O14.93 PRE-ECLAMPSIA IN THIRD TRIMESTER: ICD-10-CM

## 2023-11-13 RX ORDER — NIFEDIPINE 30 MG/1
TABLET, EXTENDED RELEASE ORAL
Qty: 90 TABLET | Refills: 0 | Status: SHIPPED | OUTPATIENT
Start: 2023-11-13 | End: 2023-11-20

## 2023-11-20 ENCOUNTER — POSTPARTUM VISIT (OUTPATIENT)
Dept: OBGYN CLINIC | Facility: CLINIC | Age: 38
End: 2023-11-20

## 2023-11-20 VITALS
HEIGHT: 69 IN | SYSTOLIC BLOOD PRESSURE: 132 MMHG | DIASTOLIC BLOOD PRESSURE: 84 MMHG | WEIGHT: 174.2 LBS | BODY MASS INDEX: 25.8 KG/M2

## 2023-11-20 DIAGNOSIS — O14.93 PRE-ECLAMPSIA IN THIRD TRIMESTER: ICD-10-CM

## 2023-11-20 PROBLEM — O09.522 MULTIGRAVIDA OF ADVANCED MATERNAL AGE IN SECOND TRIMESTER: Status: RESOLVED | Noted: 2023-06-12 | Resolved: 2023-11-20

## 2023-11-20 PROBLEM — O99.619 GASTROESOPHAGEAL REFLUX IN PREGNANCY: Status: RESOLVED | Noted: 2023-06-12 | Resolved: 2023-11-20

## 2023-11-20 PROBLEM — O09.523 AMA (ADVANCED MATERNAL AGE) MULTIGRAVIDA 35+, THIRD TRIMESTER: Status: RESOLVED | Noted: 2023-08-31 | Resolved: 2023-11-20

## 2023-11-20 PROBLEM — K21.9 GASTROESOPHAGEAL REFLUX IN PREGNANCY: Status: RESOLVED | Noted: 2023-06-12 | Resolved: 2023-11-20

## 2023-11-20 PROCEDURE — 99024 POSTOP FOLLOW-UP VISIT: CPT | Performed by: OBSTETRICS & GYNECOLOGY

## 2023-11-20 RX ORDER — NIFEDIPINE 30 MG/1
30 TABLET, EXTENDED RELEASE ORAL DAILY
Qty: 60 TABLET | Refills: 3 | Status: SHIPPED | OUTPATIENT
Start: 2023-11-20

## 2023-11-20 NOTE — PROGRESS NOTES
Assessment/Plan     Normal postpartum exam.     1. Contraception: vasectomy  2. Annual exam due in May. 3. Lactation consult, [address] [facility] [address] information discussed. 4. Increase activity as tolerated, may resume all normal activity. 5. Anticipated return to work: 6 - 12 weeks post partum. 6. PRE-ECLAMPSIA WITH SEVERE FEATURES. Will decrease procardia Xl to 30mg qAM. Patient to monitor BP this week and send in readings on Friday. Plans to f/u with cardiology due to family history. Subjective   Chief Complaint   Patient presents with    Postpartum Care       Randall Feldman is a 41 y.o.  female who presents for a postpartum visit. Delivery Method: Vaginal, Spontaneous    Delivery Date and Time: 10/10/2023  3:28 PM   Delivery Attending: Sheila Kendall   Gestational Age at delivery: Unknown   Route of Delivery: Vaginal, Spontaneous      Admitting Diagnoses:   Patient Active Problem List   Diagnosis     (spontaneous vaginal delivery)    Gastroesophageal reflux in pregnancy    Multigravida of advanced maternal age in second trimester    AMA (advanced maternal age) multigravida 35+, third trimester    Preeclampsia with severe features       Gestational Diabetes: no  Pregnancy Complications: pre-clampsia    Anesthesia: Epidural ,     Delivery: Vaginal, Spontaneous  at 10/10/2023  3:28 PM   Laceration: Perineal: 1°  Repaired? Yes     Baby's Weight: 2800 g (6 lb 2.8 oz) ; Apgar scores: 9  and 9  at 1 and 5 minutes, respectively  Baby's Name: Ramos Ates  Breast or formula: Breastfeeding  Pediatrician: Dr. Diane Coppola         Bleeding thin lochia. Bowel function: normal. Bladder function: normal. She is not having any pain. Patient has not been sexually active. Desired contraception method is vasectomy - scheduled. Postpartum depression screening: negative.  EPDS : 2    Last Pap : 2023 ; no abnormalities      The following portions of the patient's history were reviewed and updated as appropriate: allergies, current medications, past family history, past medical history, past social history, past surgical history, and problem list.      Current Outpatient Medications:     NIFEdipine (PROCARDIA XL) 30 mg 24 hr tablet, Take 1 tablet (30 mg total) by mouth daily, Disp: 60 tablet, Rfl: 3    Prenatal MV-Min-Fe Fum-FA-DHA (PRENATAL+DHA) 28-0.975 & 200 MG MISC, Take 1 tablet by mouth daily, Disp: , Rfl:     No Known Allergies    Review of Systems  Review of Systems   Constitutional:  Negative for chills and fever. Respiratory:  Negative for shortness of breath. Cardiovascular:  Negative for chest pain and leg swelling. Gastrointestinal:  Negative for abdominal distention, abdominal pain, constipation, nausea and vomiting. Genitourinary:  Positive for vaginal bleeding (light). Negative for dysuria, frequency, urgency and vaginal discharge. Neurological:  Negative for headaches. Objective      /84 (BP Location: Right arm, Patient Position: Sitting, Cuff Size: Standard)   Ht 5' 9" (1.753 m)   Wt 79 kg (174 lb 3.2 oz)   LMP 11/19/2023   Breastfeeding Yes   BMI 25.72 kg/m²     Physical Exam  Constitutional:       General: She is not in acute distress. Appearance: Normal appearance. She is well-developed. She is not ill-appearing. Pulmonary:      Effort: Pulmonary effort is normal. No respiratory distress. Neurological:      General: No focal deficit present. Mental Status: She is alert and oriented to person, place, and time. Psychiatric:         Mood and Affect: Mood normal.         Behavior: Behavior normal.         Thought Content: Thought content normal.         Judgment: Judgment normal.   Vitals and nursing note reviewed.

## 2024-05-23 ENCOUNTER — NURSE TRIAGE (OUTPATIENT)
Age: 39
End: 2024-05-23

## 2024-05-23 DIAGNOSIS — N61.0 MASTITIS: Primary | ICD-10-CM

## 2024-05-23 NOTE — TELEPHONE ENCOUNTER
"Patient reporting redness and tenderness in her right breast for the past 2 days.  She denies any fevers.  She states she has history of mastitis and feels like this is how it went before.  Appointments offered for her to come in to the office to be further evaluated.  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  She is requesting an antibiotic and confirms her pharmacy is [facility] as on file.      Reason for Disposition   Red, moist, irritated skin underneath breasts (in women with larger breasts)    Answer Assessment - Initial Assessment Questions  1. SYMPTOM: \"What's the main symptom you're concerned about?\"  (e.g., lump, pain, rash, nipple discharge)      Breast pain   2. LOCATION: \"Where is the pain located?\"      Right breast   3. ONSET: \"When did pain  start?\"      Yesterday   4. PRIOR HISTORY: \"Do you have any history of prior problems with your breasts?\" (e.g., lumps, cancer, fibrocystic breast disease)      Mastitis history   5. CAUSE: \"What do you think is causing this symptom?\"      Possible mastitis   6. OTHER SYMPTOMS: \"Do you have any other symptoms?\" (e.g., fever, breast pain, redness or rash, nipple discharge)      Tenderness, redness   7. PREGNANCY-BREASTFEEDING: \"Is there any chance you are pregnant?\" \"When was your last menstrual period?\" \"Are you breastfeeding?\"      Breastfeeding    Protocols used: Breast Symptoms-ADULT-[place]    "

## 2024-05-23 NOTE — TELEPHONE ENCOUNTER
Dicloxacillin sent to pharmacy - needs appt if she does not have significant improvement in 1-2 days

## 2024-05-24 NOTE — TELEPHONE ENCOUNTER
Left message on patient's cell phone (on communication chart) informing her of antibiotic sent to her pharmacy and to call back if symptoms are not improving within 1-2 business days.

## 2024-10-31 NOTE — ANESTHESIA PREPROCEDURE EVALUATION
Procedure:  LABOR ANALGESIA    Relevant Problems   CARDIO   (+) Preeclampsia with severe features      GI/HEPATIC   (+) Gastroesophageal reflux in pregnancy      GYN   (+) 36 weeks gestation of pregnancy   (+) AMA (advanced maternal age) multigravida 33+, third trimester   (+) Multigravida of advanced maternal age in second trimester      Genitourinary   (+) Uterine fibroids affecting pregnancy      Other   (+) History of pre-eclampsia in prior pregnancy, currently pregnant in third trimester      Recent labs personally reviewed:  Lab Results   Component Value Date    WBC 12.52 (H) 10/10/2023    HGB 11.2 (L) 10/10/2023     10/10/2023     Lab Results   Component Value Date    K 3.4 (L) 10/10/2023    BUN 7 10/10/2023    CREATININE 0.65 10/10/2023     No results found for: "PTT"   No results found for: "INR"    No results found for: "HGBA1C"    Type and Screen:  A      Physical Exam    Airway    Mallampati score: II  TM Distance: >3 FB  Neck ROM: full     Dental       Cardiovascular      Pulmonary      Other Findings        Anesthesia Plan  ASA Score- 2     Anesthesia Type- epidural with ASA Monitors. Additional Monitors:   Airway Plan:     Comment: One sided epidural previously. Issues with nausea and shaking previously. .       Plan Factors-    Chart reviewed. Existing labs reviewed. Patient summary reviewed. Induction-     Postoperative Plan-     Informed Consent- Anesthetic plan and risks discussed with patient. I personally reviewed this patient with the CRNA. Discussed and agreed on the Anesthesia Plan with the CRNA. Karen Salazar Satisfactory